# Patient Record
Sex: FEMALE | Race: WHITE | NOT HISPANIC OR LATINO | Employment: FULL TIME | URBAN - NONMETROPOLITAN AREA
[De-identification: names, ages, dates, MRNs, and addresses within clinical notes are randomized per-mention and may not be internally consistent; named-entity substitution may affect disease eponyms.]

---

## 2024-09-03 ENCOUNTER — APPOINTMENT (OUTPATIENT)
Dept: CARDIOLOGY | Facility: HOSPITAL | Age: 44
End: 2024-09-03
Payer: COMMERCIAL

## 2024-09-03 ENCOUNTER — APPOINTMENT (OUTPATIENT)
Dept: RADIOLOGY | Facility: HOSPITAL | Age: 44
End: 2024-09-03
Payer: COMMERCIAL

## 2024-09-03 ENCOUNTER — HOSPITAL ENCOUNTER (EMERGENCY)
Facility: HOSPITAL | Age: 44
Discharge: HOME | End: 2024-09-03
Attending: EMERGENCY MEDICINE
Payer: COMMERCIAL

## 2024-09-03 VITALS
OXYGEN SATURATION: 99 % | RESPIRATION RATE: 16 BRPM | TEMPERATURE: 99.1 F | HEIGHT: 61 IN | DIASTOLIC BLOOD PRESSURE: 106 MMHG | WEIGHT: 160 LBS | BODY MASS INDEX: 30.21 KG/M2 | HEART RATE: 87 BPM | SYSTOLIC BLOOD PRESSURE: 155 MMHG

## 2024-09-03 DIAGNOSIS — F10.10 ALCOHOL ABUSE: ICD-10-CM

## 2024-09-03 DIAGNOSIS — R07.9 ACUTE CHEST PAIN: Primary | ICD-10-CM

## 2024-09-03 LAB
ALBUMIN SERPL BCP-MCNC: 4.4 G/DL (ref 3.4–5)
ALP SERPL-CCNC: 62 U/L (ref 33–110)
ALT SERPL W P-5'-P-CCNC: 90 U/L (ref 7–45)
ANION GAP SERPL CALC-SCNC: 14 MMOL/L (ref 10–20)
AST SERPL W P-5'-P-CCNC: 72 U/L (ref 9–39)
ATRIAL RATE: 84 BPM
ATRIAL RATE: 99 BPM
B-HCG SERPL-ACNC: <2 MIU/ML
BASOPHILS # BLD AUTO: 0.04 X10*3/UL (ref 0–0.1)
BASOPHILS NFR BLD AUTO: 0.7 %
BILIRUB SERPL-MCNC: 0.4 MG/DL (ref 0–1.2)
BUN SERPL-MCNC: 8 MG/DL (ref 6–23)
CALCIUM SERPL-MCNC: 8.9 MG/DL (ref 8.6–10.3)
CARDIAC TROPONIN I PNL SERPL HS: 4 NG/L (ref 0–13)
CARDIAC TROPONIN I PNL SERPL HS: 5 NG/L (ref 0–13)
CHLORIDE SERPL-SCNC: 103 MMOL/L (ref 98–107)
CO2 SERPL-SCNC: 27 MMOL/L (ref 21–32)
CREAT SERPL-MCNC: 0.63 MG/DL (ref 0.5–1.05)
D DIMER PPP FEU-MCNC: 768 NG/ML FEU
EGFRCR SERPLBLD CKD-EPI 2021: >90 ML/MIN/1.73M*2
EOSINOPHIL # BLD AUTO: 0.08 X10*3/UL (ref 0–0.7)
EOSINOPHIL NFR BLD AUTO: 1.3 %
ERYTHROCYTE [DISTWIDTH] IN BLOOD BY AUTOMATED COUNT: 12.1 % (ref 11.5–14.5)
GLUCOSE SERPL-MCNC: 96 MG/DL (ref 74–99)
HCT VFR BLD AUTO: 41.7 % (ref 36–46)
HGB BLD-MCNC: 14.1 G/DL (ref 12–16)
IMM GRANULOCYTES # BLD AUTO: 0.03 X10*3/UL (ref 0–0.7)
IMM GRANULOCYTES NFR BLD AUTO: 0.5 % (ref 0–0.9)
LYMPHOCYTES # BLD AUTO: 1.33 X10*3/UL (ref 1.2–4.8)
LYMPHOCYTES NFR BLD AUTO: 22.2 %
MAGNESIUM SERPL-MCNC: 1.34 MG/DL (ref 1.6–2.4)
MCH RBC QN AUTO: 33.8 PG (ref 26–34)
MCHC RBC AUTO-ENTMCNC: 33.8 G/DL (ref 32–36)
MCV RBC AUTO: 100 FL (ref 80–100)
MONOCYTES # BLD AUTO: 0.4 X10*3/UL (ref 0.1–1)
MONOCYTES NFR BLD AUTO: 6.7 %
NEUTROPHILS # BLD AUTO: 4.12 X10*3/UL (ref 1.2–7.7)
NEUTROPHILS NFR BLD AUTO: 68.6 %
NRBC BLD-RTO: 0 /100 WBCS (ref 0–0)
P AXIS: 31 DEGREES
P OFFSET: 191 MS
P OFFSET: 192 MS
P ONSET: 134 MS
P ONSET: 135 MS
PLATELET # BLD AUTO: 200 X10*3/UL (ref 150–450)
POTASSIUM SERPL-SCNC: 3.2 MMOL/L (ref 3.5–5.3)
PR INTERVAL: 164 MS
PR INTERVAL: 164 MS
PROT SERPL-MCNC: 7.2 G/DL (ref 6.4–8.2)
Q ONSET: 216 MS
Q ONSET: 217 MS
QRS COUNT: 14 BEATS
QRS COUNT: 17 BEATS
QRS DURATION: 92 MS
QRS DURATION: 92 MS
QT INTERVAL: 348 MS
QT INTERVAL: 392 MS
QTC CALCULATION(BAZETT): 446 MS
QTC CALCULATION(BAZETT): 463 MS
QTC FREDERICIA: 411 MS
QTC FREDERICIA: 438 MS
R AXIS: 11 DEGREES
R AXIS: 176 DEGREES
RBC # BLD AUTO: 4.17 X10*6/UL (ref 4–5.2)
SODIUM SERPL-SCNC: 141 MMOL/L (ref 136–145)
T AXIS: 18 DEGREES
T AXIS: 183 DEGREES
T OFFSET: 390 MS
T OFFSET: 413 MS
TSH SERPL-ACNC: 1.37 MIU/L (ref 0.44–3.98)
VENTRICULAR RATE: 84 BPM
VENTRICULAR RATE: 99 BPM
WBC # BLD AUTO: 6 X10*3/UL (ref 4.4–11.3)

## 2024-09-03 PROCEDURE — 85379 FIBRIN DEGRADATION QUANT: CPT | Performed by: EMERGENCY MEDICINE

## 2024-09-03 PROCEDURE — 71045 X-RAY EXAM CHEST 1 VIEW: CPT | Performed by: RADIOLOGY

## 2024-09-03 PROCEDURE — 99285 EMERGENCY DEPT VISIT HI MDM: CPT

## 2024-09-03 PROCEDURE — 36415 COLL VENOUS BLD VENIPUNCTURE: CPT | Performed by: EMERGENCY MEDICINE

## 2024-09-03 PROCEDURE — 2550000001 HC RX 255 CONTRASTS: Performed by: EMERGENCY MEDICINE

## 2024-09-03 PROCEDURE — 93005 ELECTROCARDIOGRAM TRACING: CPT

## 2024-09-03 PROCEDURE — 85025 COMPLETE CBC W/AUTO DIFF WBC: CPT | Performed by: EMERGENCY MEDICINE

## 2024-09-03 PROCEDURE — 71275 CT ANGIOGRAPHY CHEST: CPT

## 2024-09-03 PROCEDURE — 71045 X-RAY EXAM CHEST 1 VIEW: CPT

## 2024-09-03 PROCEDURE — 83735 ASSAY OF MAGNESIUM: CPT | Performed by: EMERGENCY MEDICINE

## 2024-09-03 PROCEDURE — 84484 ASSAY OF TROPONIN QUANT: CPT | Performed by: EMERGENCY MEDICINE

## 2024-09-03 PROCEDURE — 96366 THER/PROPH/DIAG IV INF ADDON: CPT

## 2024-09-03 PROCEDURE — 2500000001 HC RX 250 WO HCPCS SELF ADMINISTERED DRUGS (ALT 637 FOR MEDICARE OP): Performed by: EMERGENCY MEDICINE

## 2024-09-03 PROCEDURE — 94762 N-INVAS EAR/PLS OXIMTRY CONT: CPT

## 2024-09-03 PROCEDURE — 84702 CHORIONIC GONADOTROPIN TEST: CPT | Performed by: EMERGENCY MEDICINE

## 2024-09-03 PROCEDURE — 96365 THER/PROPH/DIAG IV INF INIT: CPT | Mod: 59

## 2024-09-03 PROCEDURE — 80053 COMPREHEN METABOLIC PANEL: CPT | Performed by: EMERGENCY MEDICINE

## 2024-09-03 PROCEDURE — 84443 ASSAY THYROID STIM HORMONE: CPT | Performed by: EMERGENCY MEDICINE

## 2024-09-03 PROCEDURE — 2500000004 HC RX 250 GENERAL PHARMACY W/ HCPCS (ALT 636 FOR OP/ED): Performed by: EMERGENCY MEDICINE

## 2024-09-03 RX ORDER — BISMUTH SUBSALICYLATE 262 MG
1 TABLET,CHEWABLE ORAL DAILY
COMMUNITY

## 2024-09-03 RX ORDER — MAGNESIUM SULFATE HEPTAHYDRATE 40 MG/ML
2 INJECTION, SOLUTION INTRAVENOUS ONCE
Status: COMPLETED | OUTPATIENT
Start: 2024-09-03 | End: 2024-09-03

## 2024-09-03 RX ORDER — POTASSIUM CHLORIDE 1.5 G/1.58G
40 POWDER, FOR SOLUTION ORAL ONCE
Status: COMPLETED | OUTPATIENT
Start: 2024-09-03 | End: 2024-09-03

## 2024-09-03 RX ORDER — BUTYROSPERMUM PARKII(SHEA BUTTER), SIMMONDSIA CHINENSIS (JOJOBA) SEED OIL, ALOE BARBADENSIS LEAF EXTRACT .01; 1; 3.5 G/100G; G/100G; G/100G
250 LIQUID TOPICAL DAILY
COMMUNITY

## 2024-09-03 RX ORDER — MULTIVIT-MIN/IRON FUM/FOLIC AC 7.5 MG-4
1 TABLET ORAL DAILY
COMMUNITY

## 2024-09-03 ASSESSMENT — HEART SCORE
HEART SCORE: 0
TROPONIN: LESS THAN OR EQUAL TO NORMAL LIMIT
ECG: NORMAL
AGE: <45
HISTORY: SLIGHTLY SUSPICIOUS
RISK FACTORS: NO KNOWN RISK FACTORS

## 2024-09-03 ASSESSMENT — COLUMBIA-SUICIDE SEVERITY RATING SCALE - C-SSRS
6. HAVE YOU EVER DONE ANYTHING, STARTED TO DO ANYTHING, OR PREPARED TO DO ANYTHING TO END YOUR LIFE?: NO
1. IN THE PAST MONTH, HAVE YOU WISHED YOU WERE DEAD OR WISHED YOU COULD GO TO SLEEP AND NOT WAKE UP?: NO
2. HAVE YOU ACTUALLY HAD ANY THOUGHTS OF KILLING YOURSELF?: NO

## 2024-09-03 ASSESSMENT — PAIN SCALES - GENERAL
PAINLEVEL_OUTOF10: 0 - NO PAIN
PAINLEVEL_OUTOF10: 1
PAINLEVEL_OUTOF10: 0 - NO PAIN

## 2024-09-03 ASSESSMENT — PAIN - FUNCTIONAL ASSESSMENT
PAIN_FUNCTIONAL_ASSESSMENT: 0-10
PAIN_FUNCTIONAL_ASSESSMENT: 0-10

## 2024-09-03 ASSESSMENT — VISUAL ACUITY: OU: 1

## 2024-09-03 NOTE — ED PROVIDER NOTES
Tachycardia, chest pain.  This 43-year-old white female presents to the ED with complaints of increasing her heart rate she states that she noticed this in July 20 she was on the sun for approximate 4 hours and noted that her heart rate was racing.  She noticed this on her Apple Watch.  She states that 2 weeks ago she was walking in hot weather again and noted that her heart was racing much faster than it normally does.  She states that this morning she woke up with substernal chest tightness and tingling of her fingers she states that nothing seems to make her symptoms better or worse.  She denies previous history of having a heart catheterization or stress test.  She denies any family history of heart attacks that accommodates being less than the age of 50 she denies any use of cigarettes or other risk factors for coronary artery disease she does admit to daily use of alcohol.  Denies any illegal drug use.  She does admit to being somewhat anxious on arrival to the ED.      History provided by:  Patient   used: No         Physical Exam  Vitals and nursing note reviewed.   Constitutional:       General: She is awake.      Appearance: Normal appearance. She is normal weight.   HENT:      Head: Normocephalic and atraumatic.      Right Ear: Hearing and external ear normal.      Left Ear: Hearing and external ear normal.      Nose: Nose normal. No congestion or rhinorrhea.      Mouth/Throat:      Lips: Pink.      Mouth: Mucous membranes are moist.      Pharynx: Oropharynx is clear. Uvula midline. No oropharyngeal exudate or posterior oropharyngeal erythema.   Eyes:      General: Lids are normal. Vision grossly intact.         Right eye: No discharge.         Left eye: No discharge.      Extraocular Movements: Extraocular movements intact.      Conjunctiva/sclera: Conjunctivae normal.      Pupils: Pupils are equal, round, and reactive to light.   Cardiovascular:      Rate and Rhythm: Regular rhythm.  Tachycardia present.      Pulses: Normal pulses.      Heart sounds: Normal heart sounds. No murmur heard.     No friction rub. No gallop.   Pulmonary:      Effort: Pulmonary effort is normal. No respiratory distress.      Breath sounds: Normal breath sounds. No stridor. No wheezing, rhonchi or rales.   Chest:      Chest wall: No tenderness.   Abdominal:      General: Abdomen is flat. Bowel sounds are normal. There is no distension.      Palpations: Abdomen is soft. There is no mass.      Tenderness: There is no abdominal tenderness. There is no guarding or rebound.      Hernia: No hernia is present.      Comments: She has benign abdominal exam.   Musculoskeletal:         General: No swelling, tenderness, deformity or signs of injury. Normal range of motion.      Cervical back: Full passive range of motion without pain, normal range of motion and neck supple.      Right lower leg: Normal. No edema.      Left lower leg: Normal. No edema.   Skin:     General: Skin is warm and dry.      Capillary Refill: Capillary refill takes less than 2 seconds.      Coloration: Skin is not jaundiced or pale.      Findings: No bruising, erythema, lesion or rash.   Neurological:      General: No focal deficit present.      Mental Status: She is alert and oriented to person, place, and time.      GCS: GCS eye subscore is 4. GCS verbal subscore is 5. GCS motor subscore is 6.      Cranial Nerves: Cranial nerves 2-12 are intact. No cranial nerve deficit.      Sensory: Sensation is intact. No sensory deficit.      Motor: Motor function is intact. No weakness.      Coordination: Coordination is intact. Coordination normal.      Gait: Gait is intact.      Deep Tendon Reflexes: Reflexes normal.   Psychiatric:         Attention and Perception: Attention and perception normal.         Mood and Affect: Mood is anxious.         Speech: Speech normal.         Behavior: Behavior normal. Behavior is cooperative.         Thought Content: Thought  content normal.         Cognition and Memory: Cognition and memory normal.         Judgment: Judgment normal.          Labs Reviewed   COMPREHENSIVE METABOLIC PANEL - Abnormal       Result Value    Glucose 96      Sodium 141      Potassium 3.2 (*)     Chloride 103      Bicarbonate 27      Anion Gap 14      Urea Nitrogen 8      Creatinine 0.63      eGFR >90      Calcium 8.9      Albumin 4.4      Alkaline Phosphatase 62      Total Protein 7.2      AST 72 (*)     Bilirubin, Total 0.4      ALT 90 (*)    MAGNESIUM - Abnormal    Magnesium 1.34 (*)    D-DIMER, VTE EXCLUSION - Abnormal    D-Dimer, Quantitative VTE Exclusion 768 (*)     Narrative:     The VTE Exclusion D-Dimer assay is reported in ng/mL Fibrinogen Equivalent Units (FEU).    Per 's instructions for use, a value of less than 500 ng/mL (FEU) may help to exclude DVT or PE in outpatients when the assay is used with a clinical pretest probability assessment.([facility] must utilize and document eCalc 'Wells Score Deep Vein Thrombosis Risk' for DVT exclusion only. Emergency Department should utilize  Guidelines for Emergency Department Use of the VTE Exclusion D-Dimer and Clinical Pretest probability assessment model for DVT or PE exclusion.)   HUMAN CHORIONIC GONADOTROPIN, SERUM QUANTITATIVE - Normal    HCG, Beta-Quantitative <2      Narrative:      Total HCG measurement is performed using the [employer] Access   Immunoassay which detects intact HCG and free beta HCG subunit.    This test is not indicated for use as a tumor marker.   HCG testing is performed using a different test methodology at [facility] than other [facility]. Direct result comparison   should only be made within the same method.       TSH - Normal    Thyroid Stimulating Hormone 1.37      Narrative:     TSH testing is performed using different testing methodology at [facility] than at other [facility]. Direct result comparisons should only  be made within the same method.     SERIAL TROPONIN-INITIAL - Normal    Troponin I, High Sensitivity 4      Narrative:     Less than 99th percentile of normal range cutoff-  Female and children under 18 years old <14 ng/L; Male <21 ng/L: Negative  Repeat testing should be performed if clinically indicated.     Female and children under 18 years old 14-50 ng/L; Male 21-50 ng/L:  Consistent with possible cardiac damage and possible increased clinical   risk. Serial measurements may help to assess extent of myocardial damage.     >50 ng/L: Consistent with cardiac damage, increased clinical risk and  myocardial infarction. Serial measurements may help assess extent of   myocardial damage.      NOTE: Children less than 1 year old may have higher baseline troponin   levels and results should be interpreted in conjunction with the overall   clinical context.     NOTE: Troponin I testing is performed using a different   testing methodology at [facility] than at other   [facility]. Direct result comparisons should only   be made within the same method.   SERIAL TROPONIN, 1 HOUR - Normal    Troponin I, High Sensitivity 5      Narrative:     Less than 99th percentile of normal range cutoff-  Female and children under 18 years old <14 ng/L; Male <21 ng/L: Negative  Repeat testing should be performed if clinically indicated.     Female and children under 18 years old 14-50 ng/L; Male 21-50 ng/L:  Consistent with possible cardiac damage and possible increased clinical   risk. Serial measurements may help to assess extent of myocardial damage.     >50 ng/L: Consistent with cardiac damage, increased clinical risk and  myocardial infarction. Serial measurements may help assess extent of   myocardial damage.      NOTE: Children less than 1 year old may have higher baseline troponin   levels and results should be interpreted in conjunction with the overall   clinical context.     NOTE: Troponin I testing is  performed using a different   testing methodology at [facility] than at other   [facility]. Direct result comparisons should only   be made within the same method.   TROPONIN SERIES- (INITIAL, 1 HR)    Narrative:     The following orders were created for panel order Troponin I Series, High Sensitivity (0, 1 HR).  Procedure                               Abnormality         Status                     ---------                               -----------         ------                     Troponin I, High Sensiti...[[id number]]  Normal              Final result               Troponin, High Sensitivi...[[id number]]  Normal              Final result                 Please view results for these tests on the individual orders.   CBC WITH AUTO DIFFERENTIAL    WBC 6.0      nRBC 0.0      RBC 4.17      Hemoglobin 14.1      Hematocrit 41.7            MCH 33.8      MCHC 33.8      RDW 12.1      Platelets 200      Neutrophils % 68.6      Immature Granulocytes %, Automated 0.5      Lymphocytes % 22.2      Monocytes % 6.7      Eosinophils % 1.3      Basophils % 0.7      Neutrophils Absolute 4.12      Immature Granulocytes Absolute, Automated 0.03      Lymphocytes Absolute 1.33      Monocytes Absolute 0.40      Eosinophils Absolute 0.08      Basophils Absolute 0.04          CT angio chest for pulmonary embolism   Final Result   1.  No pulmonary embolism   2. Incidental fatty liver and hepatomegaly             MACRO:   None        Signed by: Pam Ochoa 9/3/2024 2:01 PM   Dictation workstation:   [id number]      XR chest 1 view   Final Result   No acute cardiopulmonary abnormality.        Signed by: Serg Arnold 9/3/2024 12:12 PM   Dictation workstation:   [id number]           Procedures     Medical Decision Making  Patient was seen and evaluated due to complaints of chest pain and increasing her heart rate specially associate with hot weather.  She states this chest pain symptoms started this  morning located in substernal area without radiation she was having some tingling of her hands.  Patient's initial EKG was normal.  Patient magnesium level was low and she was treated with IV magnesium sulfate.  Patient's potassium level was low and she was treated with oral potassium chloride.  The patient's D-dimer was elevated and subsequently a CTA of the chest was ordered to rule out the possibility of pulmonary embolism.  Repeat EKG was without ischemia.  After the CTA was read by the radiologist I did have a detailed discussion with the patient concerning her cardiac workup and the CTA results.  The patient CTA was negative for pulmonary embolism.  I did tell the patient that there is always a concern for cardiac arrhythmias due to alcohol abuse and that was why her magnesium and potassium levels were low.  She states that she plans on quitting alcohol.  Patient was treated with IV mag sulfate prior to be discharged home and she was provided the name of a primary care doctor that she could follow-up with as an outpatient.     Amount and/or Complexity of Data Reviewed  ECG/medicine tests: independent interpretation performed.     Details: EKG was interpreted by myself at 11:44 AM reveals normal sinus rhythm with right axis deviation and nonspecific ST-T wave changes in the inferior leads.  The heart rate is 99 bpm.  The NH interval is 164 ms.  The QRS duration 92 ms.  The QTc is 446 ms.  Axis is 176 degrees.  EKG was interpreted by myself at 12:49 PM reveals normal sinus rhythm with no acute ST or T wave changes.  The heart rate is 84 bpm NH interval is 164 ms.  The QRS duration is 92 ms.  The QTc is 463 ms.  Axis is 11 degrees.         Diagnoses as of 09/04/24 0719   Acute chest pain   Alcohol abuse                    Prosper Hooker,   09/04/24 0719

## 2024-09-20 ENCOUNTER — APPOINTMENT (OUTPATIENT)
Dept: PRIMARY CARE | Facility: CLINIC | Age: 44
End: 2024-09-20
Payer: COMMERCIAL

## 2024-09-20 VITALS
HEART RATE: 81 BPM | WEIGHT: 160.1 LBS | BODY MASS INDEX: 30.25 KG/M2 | DIASTOLIC BLOOD PRESSURE: 100 MMHG | OXYGEN SATURATION: 100 % | SYSTOLIC BLOOD PRESSURE: 160 MMHG

## 2024-09-20 DIAGNOSIS — K70.0 ALCOHOLIC FATTY LIVER: ICD-10-CM

## 2024-09-20 DIAGNOSIS — R74.8 ELEVATED LIVER ENZYMES: ICD-10-CM

## 2024-09-20 DIAGNOSIS — R00.0 TACHYCARDIA: Primary | ICD-10-CM

## 2024-09-20 PROCEDURE — 1036F TOBACCO NON-USER: CPT | Performed by: FAMILY MEDICINE

## 2024-09-20 PROCEDURE — 99214 OFFICE O/P EST MOD 30 MIN: CPT | Performed by: FAMILY MEDICINE

## 2024-09-20 RX ORDER — CLONIDINE HYDROCHLORIDE 0.1 MG/1
0.1 TABLET ORAL 2 TIMES DAILY
Qty: 60 TABLET | Refills: 0 | Status: SHIPPED | OUTPATIENT
Start: 2024-09-20 | End: 2025-03-19

## 2024-09-20 RX ORDER — BUPROPION HYDROCHLORIDE 150 MG/1
150 TABLET ORAL EVERY MORNING
Qty: 30 TABLET | Refills: 0 | Status: SHIPPED | OUTPATIENT
Start: 2024-09-20 | End: 2025-03-19

## 2024-09-20 RX ORDER — MAGNESIUM 250 MG
250 TABLET ORAL DAILY
Qty: 90 TABLET | Refills: 0 | Status: SHIPPED | OUTPATIENT
Start: 2024-09-20 | End: 2025-09-20

## 2024-09-20 RX ORDER — CHOLECALCIFEROL (VITAMIN D3) 125 MCG
1 CAPSULE ORAL AS NEEDED
COMMUNITY

## 2024-09-20 NOTE — PROGRESS NOTES
Subjective   Patient ID: Socorro Valderrama is a 43 y.o. female who presents for ER Follow-up (9/3 [facility]; acute chest pain, alcohol abuse; high blood pressure, stress due to mom being in the hospital).    ER Follow-up       ER follow-up from 9/3/2024 for racing heart rate which has been off and on since July 20 day of ED had substernal chest tightness and tingling of her fingers drinks alcohol daily no cigarettes  EKG normal sinus rhythm with a rate of 99 chest x-ray normal  Potassium 3.2 with elevated liver enzymes less than 3 times upper limit of normal  Elevated D-dimer at 768 with CT angio chest was negative for PE had fatty liver and large liver.    Tsh normal   Magnesium low     Takes a mvi   Alchohol daily wants to quit   Liquor 8 oz per day has escalated       4 years ago started   Spikes when hot in the heat   117 hr just standing 130 s with walking     Patient given 40 mill equivalents of potassium    Resting HR is normal 70   Anxiety with mothers sickness     Reason for alchohol   Withdrawal symptoms   Was able to quit when breakdown    Between drinks  930 by late afternoon elevated heartrate       Fhx strong for alcoholism with  MGM and mother     Holter done and no worrisome arrhthymias      Review of Systems    Objective   BP (!) 160/100   Pulse 81   Wt 72.6 kg (160 lb 1.6 oz)   SpO2 100%   BMI 30.25 kg/m²     Physical Exam  Vitals reviewed.   Constitutional:       Appearance: Normal appearance.   HENT:      Head: Normocephalic and atraumatic.   Eyes:      Conjunctiva/sclera: Conjunctivae normal.   Cardiovascular:      Rate and Rhythm: Normal rate and regular rhythm.   Pulmonary:      Effort: Pulmonary effort is normal.      Breath sounds: Normal breath sounds.   Musculoskeletal:      Cervical back: Neck supple.   Skin:     General: Skin is warm and dry.   Neurological:      General: No focal deficit present.      Mental Status: She is alert and oriented to person, place, and time.   Psychiatric:          Mood and Affect: Mood normal.         Behavior: Behavior normal.         Thought Content: Thought content normal.         Judgment: Judgment normal.         Assessment/Plan   Diagnoses and all orders for this visit:  Tachycardia  -     Holter or Event Cardiac Monitor; Future  -     Basic Metabolic Panel; Future  -     Magnesium; Future  -     cloNIDine (Catapres) 0.1 mg tablet; Take 1 tablet (0.1 mg) by mouth 2 times a day.  -     buPROPion XL (Wellbutrin XL) 150 mg 24 hr tablet; Take 1 tablet (150 mg) by mouth once daily in the morning. Do not crush, chew, or split.  -     magnesium 250 mg tablet; Take 1 tablet (250 mg) by mouth once daily.  Alcoholic fatty liver  Elevated liver enzymes    Will wean  off alcohol 1 drink per 5 days  Discussed   Librium but does not want to  start

## 2024-09-25 ENCOUNTER — HOSPITAL ENCOUNTER (OUTPATIENT)
Dept: CARDIOLOGY | Facility: HOSPITAL | Age: 44
Discharge: HOME | End: 2024-09-25
Payer: COMMERCIAL

## 2024-09-25 DIAGNOSIS — R00.0 TACHYCARDIA: ICD-10-CM

## 2024-09-25 PROCEDURE — 9420000001 HC RT PATIENT EDUCATION 5 MIN

## 2024-09-25 PROCEDURE — 93225 XTRNL ECG REC<48 HRS REC: CPT

## 2024-09-26 ENCOUNTER — APPOINTMENT (OUTPATIENT)
Dept: CARDIOLOGY | Facility: HOSPITAL | Age: 44
End: 2024-09-26
Payer: COMMERCIAL

## 2024-10-12 DIAGNOSIS — R00.0 TACHYCARDIA: ICD-10-CM

## 2024-10-14 RX ORDER — BUPROPION HYDROCHLORIDE 150 MG/1
150 TABLET ORAL EVERY MORNING
Qty: 90 TABLET | Refills: 1 | OUTPATIENT
Start: 2024-10-14 | End: 2025-04-12

## 2024-10-14 RX ORDER — CLONIDINE HYDROCHLORIDE 0.1 MG/1
0.1 TABLET ORAL 2 TIMES DAILY
Qty: 180 TABLET | Refills: 1 | OUTPATIENT
Start: 2024-10-14 | End: 2025-04-12

## 2024-10-18 ENCOUNTER — LAB (OUTPATIENT)
Dept: LAB | Facility: LAB | Age: 44
End: 2024-10-18
Payer: COMMERCIAL

## 2024-10-18 DIAGNOSIS — R00.0 TACHYCARDIA: ICD-10-CM

## 2024-10-18 LAB
ANION GAP SERPL CALC-SCNC: 14 MMOL/L (ref 10–20)
BUN SERPL-MCNC: 8 MG/DL (ref 6–23)
CALCIUM SERPL-MCNC: 9.2 MG/DL (ref 8.6–10.3)
CHLORIDE SERPL-SCNC: 102 MMOL/L (ref 98–107)
CO2 SERPL-SCNC: 27 MMOL/L (ref 21–32)
CREAT SERPL-MCNC: 0.62 MG/DL (ref 0.5–1.05)
EGFRCR SERPLBLD CKD-EPI 2021: >90 ML/MIN/1.73M*2
GLUCOSE SERPL-MCNC: 90 MG/DL (ref 74–99)
MAGNESIUM SERPL-MCNC: 1.43 MG/DL (ref 1.6–2.4)
POTASSIUM SERPL-SCNC: 4.3 MMOL/L (ref 3.5–5.3)
SODIUM SERPL-SCNC: 139 MMOL/L (ref 136–145)

## 2024-10-18 PROCEDURE — 83735 ASSAY OF MAGNESIUM: CPT

## 2024-10-18 PROCEDURE — 36415 COLL VENOUS BLD VENIPUNCTURE: CPT

## 2024-10-18 PROCEDURE — 80048 BASIC METABOLIC PNL TOTAL CA: CPT

## 2024-10-21 ENCOUNTER — APPOINTMENT (OUTPATIENT)
Dept: PRIMARY CARE | Facility: CLINIC | Age: 44
End: 2024-10-21
Payer: COMMERCIAL

## 2024-10-21 VITALS
OXYGEN SATURATION: 100 % | SYSTOLIC BLOOD PRESSURE: 150 MMHG | BODY MASS INDEX: 30.8 KG/M2 | DIASTOLIC BLOOD PRESSURE: 90 MMHG | WEIGHT: 163 LBS | HEART RATE: 99 BPM

## 2024-10-21 DIAGNOSIS — R00.0 TACHYCARDIA: ICD-10-CM

## 2024-10-21 PROCEDURE — 99213 OFFICE O/P EST LOW 20 MIN: CPT | Performed by: FAMILY MEDICINE

## 2024-10-21 RX ORDER — CLONIDINE HYDROCHLORIDE 0.1 MG/1
0.1 TABLET ORAL 2 TIMES DAILY
Qty: 60 TABLET | Refills: 2 | Status: SHIPPED | OUTPATIENT
Start: 2024-10-21 | End: 2025-04-19

## 2024-10-21 NOTE — PROGRESS NOTES
Subjective   Patient ID: Socorro Valderrama is a 43 y.o. female who presents for Follow-up (Tachycardia; 9/25/24 Holter was normal) and Alcohol Problem (Has weaned down to 5 drinks a day).    Alcohol Problem       Will wean  off alcohol 1 drink per 5 days  Discussed   Librium but does not want to  start      Increase  magnesium to 500 mg    Does not always drink 5 only   Weekends are harder and access is easy   Shakiness and gagging not nasuea       Alchohol daily wants to quit   Liquor 8 oz per day has escalated       4 years ago started   Spikes when hot in the heat   117 hr just standing 130 s with walking      Patient given 40 mill equivalents of potassium     Resting HR is normal 70   Anxiety with mothers sickness      Reason for alchohol   Withdrawal symptoms   Was able to quit when breakdown    Between drinks  930 by late afternoon elevated heartrate         Fhx strong for alcoholism with  MGM and mother      Holter done and no worrisome arrhythmias    Exercising and eating better   If misses meals does not feel as well     Socially not a problem with drinking    has quit drinking   Following people on social media   Counseling   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   Neurological:      Mental Status: She is alert.   Psychiatric:         Mood and Affect: Mood normal.         Behavior: Behavior normal.         Thought Content: Thought content normal.         Judgment: Judgment normal.         Assessment/Plan   Diagnoses and all orders for this visit:  Tachycardia  -     cloNIDine (Catapres) 0.1 mg tablet; Take 1 tablet (0.1 mg) by mouth 2 times a day.  -     Magnesium; Future    Discussed campral   Also discussed ways to monitor alcohol on the  weekends when it is harder to control.

## 2024-11-19 DIAGNOSIS — R00.0 TACHYCARDIA: ICD-10-CM

## 2024-11-20 RX ORDER — TALC
1 POWDER (GRAM) TOPICAL DAILY
Qty: 90 TABLET | OUTPATIENT
Start: 2024-11-20

## 2024-11-26 ENCOUNTER — APPOINTMENT (OUTPATIENT)
Dept: PRIMARY CARE | Facility: CLINIC | Age: 44
End: 2024-11-26
Payer: COMMERCIAL

## 2025-01-14 DIAGNOSIS — R00.0 TACHYCARDIA: ICD-10-CM

## 2025-01-14 RX ORDER — CLONIDINE HYDROCHLORIDE 0.1 MG/1
0.1 TABLET ORAL 2 TIMES DAILY
Qty: 60 TABLET | Refills: 2 | OUTPATIENT
Start: 2025-01-14

## 2025-01-17 DIAGNOSIS — R00.0 TACHYCARDIA: ICD-10-CM

## 2025-01-17 RX ORDER — CLONIDINE HYDROCHLORIDE 0.1 MG/1
0.1 TABLET ORAL 2 TIMES DAILY
Qty: 60 TABLET | Refills: 0 | Status: SHIPPED | OUTPATIENT
Start: 2025-01-17 | End: 2025-07-16

## 2025-01-17 NOTE — TELEPHONE ENCOUNTER
cloNIDine (Catapres) 0.1 mg tablet [[id number]]    Order Details  Dose: 0.1 mg Route: oral Frequency: 2 times daily   Dispense Quantity: 60 tablet Refills: 2          Sig: Take 1 tablet (0.1 mg) by mouth 2 times a day.   [facility]. SHE HAS A APPOINTMENT ON 1/22/25 AT 1:20 WITH ANDRE TO GET MORE REFILLS BUT WILL BE OUT BEFORE THAT/ THANK YOU. SHE WOULD LIKE A CALL BACK SO SHE KNOWS WHEN TO GET THEM.

## 2025-01-22 ENCOUNTER — APPOINTMENT (OUTPATIENT)
Dept: PRIMARY CARE | Facility: CLINIC | Age: 45
End: 2025-01-22
Payer: COMMERCIAL

## 2025-01-22 VITALS
SYSTOLIC BLOOD PRESSURE: 146 MMHG | OXYGEN SATURATION: 98 % | WEIGHT: 159 LBS | DIASTOLIC BLOOD PRESSURE: 104 MMHG | BODY MASS INDEX: 29.26 KG/M2 | HEART RATE: 94 BPM | HEIGHT: 62 IN

## 2025-01-22 DIAGNOSIS — I15.8 OTHER SECONDARY HYPERTENSION: Primary | ICD-10-CM

## 2025-01-22 DIAGNOSIS — R00.0 TACHYCARDIA: ICD-10-CM

## 2025-01-22 PROCEDURE — 1036F TOBACCO NON-USER: CPT | Performed by: NURSE PRACTITIONER

## 2025-01-22 PROCEDURE — 99214 OFFICE O/P EST MOD 30 MIN: CPT | Performed by: NURSE PRACTITIONER

## 2025-01-22 PROCEDURE — 3080F DIAST BP >= 90 MM HG: CPT | Performed by: NURSE PRACTITIONER

## 2025-01-22 PROCEDURE — 3077F SYST BP >= 140 MM HG: CPT | Performed by: NURSE PRACTITIONER

## 2025-01-22 PROCEDURE — 3008F BODY MASS INDEX DOCD: CPT | Performed by: NURSE PRACTITIONER

## 2025-01-22 RX ORDER — NEBULIZER AND COMPRESSOR
1 EACH MISCELLANEOUS 2 TIMES DAILY
Qty: 1 EACH | Refills: 0 | Status: SHIPPED | OUTPATIENT
Start: 2025-01-22

## 2025-01-22 RX ORDER — LOSARTAN POTASSIUM 25 MG/1
25 TABLET ORAL DAILY
Qty: 90 TABLET | Refills: 0 | Status: SHIPPED | OUTPATIENT
Start: 2025-01-22

## 2025-01-22 RX ORDER — NEBULIZER AND COMPRESSOR
1 EACH MISCELLANEOUS 2 TIMES DAILY
Qty: 1 EACH | Refills: 0 | Status: SHIPPED | OUTPATIENT
Start: 2025-01-22 | End: 2025-01-22

## 2025-01-22 RX ORDER — CLONIDINE HYDROCHLORIDE 0.1 MG/1
0.1 TABLET ORAL 2 TIMES DAILY
Qty: 180 TABLET | Refills: 1 | Status: SHIPPED | OUTPATIENT
Start: 2025-01-22

## 2025-01-22 ASSESSMENT — ENCOUNTER SYMPTOMS
CHILLS: 0
FATIGUE: 0
ABDOMINAL DISTENTION: 0
NUMBNESS: 0
NAUSEA: 0
HEADACHES: 0
WHEEZING: 0
WEAKNESS: 0
FEVER: 0
CONSTIPATION: 0
DIARRHEA: 0
ABDOMINAL PAIN: 0
VOMITING: 0
COUGH: 0
SPEECH DIFFICULTY: 0
CARDIOVASCULAR NEGATIVE: 1
DIZZINESS: 0
SHORTNESS OF BREATH: 0
PALPITATIONS: 0

## 2025-01-22 ASSESSMENT — PATIENT HEALTH QUESTIONNAIRE - PHQ9
2. FEELING DOWN, DEPRESSED OR HOPELESS: NOT AT ALL
SUM OF ALL RESPONSES TO PHQ9 QUESTIONS 1 AND 2: 0
1. LITTLE INTEREST OR PLEASURE IN DOING THINGS: NOT AT ALL

## 2025-01-22 NOTE — PROGRESS NOTES
"Subjective   Patient ID: Socorro Valderrama is a 44 y.o. female who presents for Med Management.  45 yo female presents for medication refills on her Clonidine.  Noted her elevated BP.  Denies any symptoms of ha, dizziness, near syncope, cp or sob.  States she continues to drink 8 oz of alcohol daily and is considering treatment in [place].  Discussed ways she could begin to decrease her intake safely prior to appt in [place].    She believes her mother and possibly her father had HTN.          Review of Systems   Constitutional:  Negative for chills, fatigue and fever.   HENT: Negative.     Respiratory:  Negative for cough, shortness of breath and wheezing.    Cardiovascular: Negative.  Negative for chest pain and palpitations.   Gastrointestinal:  Negative for abdominal distention, abdominal pain, constipation, diarrhea, nausea and vomiting.   Genitourinary: Negative.    Neurological:  Negative for dizziness, syncope, speech difficulty, weakness, numbness and headaches.       Objective   Physical Exam  Constitutional:       General: She is not in acute distress.  Cardiovascular:      Rate and Rhythm: Normal rate and regular rhythm.   Pulmonary:      Breath sounds: No wheezing, rhonchi or rales.   Skin:     General: Skin is warm and dry.   Neurological:      Mental Status: She is alert.       BP (!) 146/104 (BP Location: Left arm, Patient Position: Sitting)   Pulse 94   Ht 1.562 m (5' 1.5\")   Wt 72.1 kg (159 lb)   SpO2 98%   BMI 29.56 kg/m²       Current Outpatient Medications:     magnesium 250 mg tablet, Take 1 tablet (250 mg) by mouth once daily., Disp: 90 tablet, Rfl: 0    multivitamin tablet, Take 1 tablet by mouth once daily., Disp: , Rfl:     naproxen sodium (Aleve) 220 mg capsule, Take 1 tablet by mouth if needed. OTC, Disp: , Rfl:     cloNIDine (Catapres) 0.1 mg tablet, Take 1 tablet (0.1 mg) by mouth 2 times a day., Disp: 180 tablet, Rfl: 1    losartan (Cozaar) 25 mg tablet, Take 1 tablet (25 mg) by " mouth once daily., Disp: 90 tablet, Rfl: 0    miscellaneous medical supply (Blood Pressure Cuff) misc, 1 each 2 times a day. Antecubital cuff, Disp: 1 each, Rfl: 0   Past Medical History:   Diagnosis Date    Encounter for full-term uncomplicated delivery     Normal vaginal delivery    Encounter for gynecological examination (general) (routine) without abnormal findings     Pap test, as part of routine gynecological examination    Other conditions influencing health status     Menstruation    Personal history of other complications of pregnancy, childbirth and the puerperium     H/O miscarriage, not currently pregnant      Past Surgical History:   Procedure Laterality Date    OTHER SURGICAL HISTORY  03/27/2020    Surgery    OTHER SURGICAL HISTORY  03/27/2020    Lumpectomy    OTHER SURGICAL HISTORY Left     knee, 2 years ago        Family History   Problem Relation Name Age of Onset    Hepatitis Father      Diabetes Maternal Grandfather      Breast cancer Paternal Grandmother      Cancer Other      Diabetes Other          Assessment/Plan   Problem List Items Addressed This Visit    None  Visit Diagnoses       Other secondary hypertension    -  Primary    Relevant Medications    losartan (Cozaar) 25 mg tablet    miscellaneous medical supply (Blood Pressure Cuff) misc    Tachycardia        Relevant Medications    cloNIDine (Catapres) 0.1 mg tablet    losartan (Cozaar) 25 mg tablet    miscellaneous medical supply (Blood Pressure Cuff) misc        She will start checking her BP at home am and pm.  RX for cuff given.  Start on Losartan 25mg daily and continue on the Clonidine BID.  Discussed risks of CVA and MI and educated on symptoms.  She will go to ER if develops any symptoms.  Discussed safe way to decrease her alcohol intake and she will discuss referral to treatment center IP in [place] on her next visit.    Pt is going directly home to take an additional clonidine now and again at hs.  Follow up in 1 week.

## 2025-02-05 ENCOUNTER — APPOINTMENT (OUTPATIENT)
Dept: PRIMARY CARE | Facility: CLINIC | Age: 45
End: 2025-02-05
Payer: COMMERCIAL

## 2025-02-12 ENCOUNTER — APPOINTMENT (OUTPATIENT)
Dept: PRIMARY CARE | Facility: CLINIC | Age: 45
End: 2025-02-12
Payer: COMMERCIAL

## 2025-02-12 ENCOUNTER — TELEPHONE (OUTPATIENT)
Dept: PRIMARY CARE | Facility: CLINIC | Age: 45
End: 2025-02-12

## 2025-02-12 VITALS
SYSTOLIC BLOOD PRESSURE: 140 MMHG | OXYGEN SATURATION: 98 % | HEART RATE: 70 BPM | BODY MASS INDEX: 29.37 KG/M2 | DIASTOLIC BLOOD PRESSURE: 90 MMHG | WEIGHT: 158 LBS

## 2025-02-12 DIAGNOSIS — F41.9 ANXIETY: ICD-10-CM

## 2025-02-12 DIAGNOSIS — R00.0 TACHYCARDIA: ICD-10-CM

## 2025-02-12 DIAGNOSIS — F10.280 ALCOHOL DEPENDENCE WITH ALCOHOL-INDUCED ANXIETY DISORDER (MULTI): ICD-10-CM

## 2025-02-12 DIAGNOSIS — I10 PRIMARY HYPERTENSION: Primary | ICD-10-CM

## 2025-02-12 PROCEDURE — 99213 OFFICE O/P EST LOW 20 MIN: CPT | Performed by: NURSE PRACTITIONER

## 2025-02-12 PROCEDURE — 1036F TOBACCO NON-USER: CPT | Performed by: NURSE PRACTITIONER

## 2025-02-12 PROCEDURE — 3080F DIAST BP >= 90 MM HG: CPT | Performed by: NURSE PRACTITIONER

## 2025-02-12 PROCEDURE — 3077F SYST BP >= 140 MM HG: CPT | Performed by: NURSE PRACTITIONER

## 2025-02-12 RX ORDER — CLONIDINE HYDROCHLORIDE 0.1 MG/1
0.1 TABLET ORAL 3 TIMES DAILY
Qty: 180 TABLET | Refills: 1 | Status: SHIPPED | OUTPATIENT
Start: 2025-02-12

## 2025-02-12 ASSESSMENT — ENCOUNTER SYMPTOMS
DIARRHEA: 0
VOMITING: 0
PALPITATIONS: 0
SHORTNESS OF BREATH: 0
FEVER: 0
COUGH: 0
NAUSEA: 0
WHEEZING: 0
NERVOUS/ANXIOUS: 1

## 2025-02-12 ASSESSMENT — PATIENT HEALTH QUESTIONNAIRE - PHQ9
1. LITTLE INTEREST OR PLEASURE IN DOING THINGS: NOT AT ALL
SUM OF ALL RESPONSES TO PHQ9 QUESTIONS 1 AND 2: 0
2. FEELING DOWN, DEPRESSED OR HOPELESS: NOT AT ALL

## 2025-02-12 NOTE — PROGRESS NOTES
Subjective   Patient ID: Socorro Valderrama is a 44 y.o. female who presents for Follow-up.  Patient presents today for follow up of HTN.  Noted she was in ER on 2-9 for anxiety.  States she continues to monitor her watch for her HR and believes that is making her anxious.  She would like a referral for alcohol addition in [place].          Review of Systems   Constitutional:  Negative for fever.   Respiratory:  Negative for cough, shortness of breath and wheezing.    Cardiovascular:  Negative for chest pain and palpitations.   Gastrointestinal:  Negative for diarrhea, nausea and vomiting.   Psychiatric/Behavioral:  The patient is nervous/anxious.        Objective   Physical Exam  Vitals and nursing note reviewed.   Constitutional:       General: She is not in acute distress.     Appearance: She is not ill-appearing.   Cardiovascular:      Rate and Rhythm: Normal rate and regular rhythm.      Pulses: Normal pulses.      Heart sounds: Normal heart sounds. No murmur heard.     No friction rub. No gallop.   Pulmonary:      Effort: Pulmonary effort is normal.      Breath sounds: Normal breath sounds. No wheezing, rhonchi or rales.   Musculoskeletal:      Right lower leg: No edema.      Left lower leg: No edema.   Skin:     General: Skin is warm and dry.   Neurological:      Mental Status: She is alert.   Psychiatric:         Mood and Affect: Mood is anxious.       /90 (BP Location: Left arm, Patient Position: Sitting)   Pulse 70   Wt 71.7 kg (158 lb)   SpO2 98%   BMI 29.37 kg/m²       Current Outpatient Medications:     losartan (Cozaar) 25 mg tablet, Take 1 tablet (25 mg) by mouth once daily., Disp: 90 tablet, Rfl: 0    magnesium 250 mg tablet, Take 1 tablet (250 mg) by mouth once daily., Disp: 90 tablet, Rfl: 0    miscellaneous medical supply (Blood Pressure Cuff) misc, 1 each 2 times a day. Antecubital cuff, Disp: 1 each, Rfl: 0    multivitamin tablet, Take 1 tablet by mouth once daily., Disp: , Rfl:      naproxen sodium (Aleve) 220 mg capsule, Take 1 tablet by mouth if needed. OTC, Disp: , Rfl:     cloNIDine (Catapres) 0.1 mg tablet, Take 1 tablet (0.1 mg) by mouth 3 times a day., Disp: 180 tablet, Rfl: 1   Past Medical History:   Diagnosis Date    Encounter for full-term uncomplicated delivery     Normal vaginal delivery    Encounter for gynecological examination (general) (routine) without abnormal findings     Pap test, as part of routine gynecological examination    Other conditions influencing health status     Menstruation    Personal history of other complications of pregnancy, childbirth and the puerperium     H/O miscarriage, not currently pregnant      Past Surgical History:   Procedure Laterality Date    OTHER SURGICAL HISTORY  03/27/2020    Surgery    OTHER SURGICAL HISTORY  03/27/2020    Lumpectomy    OTHER SURGICAL HISTORY Left     knee, 2 years ago        Family History   Problem Relation Name Age of Onset    Hepatitis Father      Diabetes Maternal Grandfather      Breast cancer Paternal Grandmother      Cancer Other      Diabetes Other          Assessment/Plan   Problem List Items Addressed This Visit    None  Visit Diagnoses       Primary hypertension    -  Primary    Tachycardia        Relevant Medications    cloNIDine (Catapres) 0.1 mg tablet    Anxiety        Relevant Orders    TSH    Alcohol dependence with alcohol-induced anxiety disorder (Multi)        Relevant Orders    Referral to [facility]        Obtain TSH.  Increase her clonidine to 0.1mg tid and refer to addition medicine; pt requests [facility].

## 2025-02-12 NOTE — TELEPHONE ENCOUNTER
[facility] for patient that Bruna Pearl asked me to call her with information for [facility] that she can call to request and assessment for OP treatment.  Their number is [phone] and they are located at [address] [place] [address] in [facility].  She can call if any questions

## 2025-02-13 LAB — TSH SERPL-ACNC: 1.56 MIU/L

## 2025-03-12 ENCOUNTER — APPOINTMENT (OUTPATIENT)
Dept: PRIMARY CARE | Facility: CLINIC | Age: 45
End: 2025-03-12
Payer: COMMERCIAL

## 2025-03-21 ENCOUNTER — APPOINTMENT (OUTPATIENT)
Dept: PRIMARY CARE | Facility: CLINIC | Age: 45
End: 2025-03-21
Payer: COMMERCIAL

## 2025-04-22 ENCOUNTER — HOSPITAL ENCOUNTER (INPATIENT)
Dept: HOSPITAL 100 - ED | Age: 45
LOS: 4 days | Discharge: HOME | DRG: 897 | End: 2025-04-26
Payer: COMMERCIAL

## 2025-04-22 VITALS
DIASTOLIC BLOOD PRESSURE: 109 MMHG | SYSTOLIC BLOOD PRESSURE: 176 MMHG | TEMPERATURE: 97.88 F | HEART RATE: 69 BPM | RESPIRATION RATE: 18 BRPM | OXYGEN SATURATION: 100 %

## 2025-04-22 VITALS
HEART RATE: 85 BPM | SYSTOLIC BLOOD PRESSURE: 169 MMHG | RESPIRATION RATE: 17 BRPM | DIASTOLIC BLOOD PRESSURE: 111 MMHG | TEMPERATURE: 98.96 F | OXYGEN SATURATION: 98 %

## 2025-04-22 VITALS
DIASTOLIC BLOOD PRESSURE: 115 MMHG | TEMPERATURE: 98.3 F | RESPIRATION RATE: 16 BRPM | SYSTOLIC BLOOD PRESSURE: 175 MMHG | HEART RATE: 73 BPM | OXYGEN SATURATION: 100 %

## 2025-04-22 VITALS — BODY MASS INDEX: 30.1 KG/M2 | BODY MASS INDEX: 30 KG/M2 | BODY MASS INDEX: 31.4 KG/M2

## 2025-04-22 VITALS
DIASTOLIC BLOOD PRESSURE: 137 MMHG | SYSTOLIC BLOOD PRESSURE: 205 MMHG | HEART RATE: 86 BPM | OXYGEN SATURATION: 95 % | RESPIRATION RATE: 20 BRPM | TEMPERATURE: 98.06 F

## 2025-04-22 VITALS — OXYGEN SATURATION: 98 % | RESPIRATION RATE: 18 BRPM | HEART RATE: 90 BPM

## 2025-04-22 VITALS — RESPIRATION RATE: 18 BRPM
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
## 2025-04-22 DIAGNOSIS — F10.239: Primary | ICD-10-CM

## 2025-04-22 DIAGNOSIS — I10: ICD-10-CM

## 2025-04-22 DIAGNOSIS — Z79.899: ICD-10-CM

## 2025-04-22 DIAGNOSIS — E66.811: ICD-10-CM

## 2025-04-22 DIAGNOSIS — K70.10: ICD-10-CM

## 2025-04-22 DIAGNOSIS — E87.6: ICD-10-CM

## 2025-04-22 LAB
ALBUMIN SERPL-MCNC: 4.5 G/DL (ref 3.5–5)
ALCOHOL, BLOOD (MEDICAL)-SERUM: 67.9 MG/DL (ref ?–10)
ALP SERPL-CCNC: 59 U/L (ref 35–104)
ALT SERPL-CCNC: 78 U/L (ref ?–34)
AMPHETAMINES UR QL SCN>1000 NG/ML: NEGATIVE
ANION GAP SERPL CALC-SCNC: 19 MMOL/L (ref 5–15)
AST(SGOT): 147 U/L (ref ?–31)
BENZODIAZEPINE URINE: NEGATIVE
BLOOD UR QL: (no result) /HPF (ref 0–5)
BLOOD UR QL: 10 /UL
BUN SERPL-MCNC: 6 MG/DL (ref 4–19)
BUN/CREAT SERPL: 10.4 RATIO (ref 10–20)
CALCIUM SERPL-MCNC: 9.1 MG/DL (ref 7.6–11)
CHLORIDE: 98 MMOL/L (ref 98–108)
CO2 BLDCV-SCNC: 21.4 MMOL/L (ref 21–32)
DEPRECATED RDW RBC: 47.8 FL (ref 35.1–43.9)
ERYTHROCYTE [DISTWIDTH] IN BLOOD: 13.2 % (ref 11.6–14.6)
ESTIMATED CREATININE CLEARANCE: 107.42 ML/MIN (ref 50–250)
GLOBULIN: 2.6 G/DL (ref 2.2–4.2)
GLUCOSE SERPL-MCNC: 75 MG/DL (ref 70–99)
HCG SERPL QL: NEGATIVE NEGATIVE
HCT VFR BLD AUTO: 37.4 % (ref 37–47)
HGB BLD-MCNC: 13.4 G/DL (ref 12–15)
INTERNAL QC VALIDATED?: (no result)
KETONE-DIPSTICK: 150 MG/DL
LIPASE: 75 U/L (ref 13–75)
MCV RBC: 99.2 FL (ref 81–99)
MEAN CORP HGB CONC: 35.8 G/DL (ref 32–36)
MEAN PLATELET VOL.: 10.6 FL (ref 6.2–12)
MUCOUS THREADS URNS QL MICRO: (no result) /HPF
NRBC FLAGGED BY ANALYZER: 0 % (ref 0–5)
PCP UR QL: NEGATIVE
PLATELET # BLD: 156 K/MM3 (ref 150–450)
POTASSIUM SPEC-MCNC: 3.4 MMOL/L (ref 3.3–5.1)
PROT UR QL STRIP.AUTO: 100 MG/DL
RBC # BLD AUTO: 3.77 M/MM3 (ref 4.2–5.4)
SP GR UR: 1.01 (ref 1–1.03)
SQUAMOUS URNS QL MICRO: (no result) /HPF (ref 5–10)
THC URINE: NEGATIVE
URINE PRESERVATIVE: (no result)
WBC # BLD AUTO: 5.7 K/MM3 (ref 4.4–11)

## 2025-04-22 PROCEDURE — 83690 ASSAY OF LIPASE: CPT

## 2025-04-22 PROCEDURE — 99284 EMERGENCY DEPT VISIT MOD MDM: CPT

## 2025-04-22 PROCEDURE — 84703 CHORIONIC GONADOTROPIN ASSAY: CPT

## 2025-04-22 PROCEDURE — 84100 ASSAY OF PHOSPHORUS: CPT

## 2025-04-22 PROCEDURE — 36415 COLL VENOUS BLD VENIPUNCTURE: CPT

## 2025-04-22 PROCEDURE — 80053 COMPREHEN METABOLIC PANEL: CPT

## 2025-04-22 PROCEDURE — A4216 STERILE WATER/SALINE, 10 ML: HCPCS

## 2025-04-22 PROCEDURE — 80307 DRUG TEST PRSMV CHEM ANLYZR: CPT

## 2025-04-22 PROCEDURE — 80048 BASIC METABOLIC PNL TOTAL CA: CPT

## 2025-04-22 PROCEDURE — 83735 ASSAY OF MAGNESIUM: CPT

## 2025-04-22 PROCEDURE — 82077 ASSAY SPEC XCP UR&BREATH IA: CPT

## 2025-04-22 PROCEDURE — 80076 HEPATIC FUNCTION PANEL: CPT

## 2025-04-22 PROCEDURE — 85025 COMPLETE CBC W/AUTO DIFF WBC: CPT

## 2025-04-22 PROCEDURE — 81001 URINALYSIS AUTO W/SCOPE: CPT

## 2025-04-22 RX ADMIN — HYDROXYZINE PAMOATE 50 MG: 25 CAPSULE ORAL at 20:16

## 2025-04-23 VITALS
SYSTOLIC BLOOD PRESSURE: 145 MMHG | TEMPERATURE: 97.6 F | HEART RATE: 81 BPM | OXYGEN SATURATION: 98 % | DIASTOLIC BLOOD PRESSURE: 105 MMHG | RESPIRATION RATE: 15 BRPM

## 2025-04-23 VITALS
OXYGEN SATURATION: 99 % | RESPIRATION RATE: 17 BRPM | TEMPERATURE: 97.88 F | HEART RATE: 83 BPM | SYSTOLIC BLOOD PRESSURE: 143 MMHG | DIASTOLIC BLOOD PRESSURE: 107 MMHG

## 2025-04-23 VITALS
RESPIRATION RATE: 16 BRPM | SYSTOLIC BLOOD PRESSURE: 143 MMHG | HEART RATE: 70 BPM | DIASTOLIC BLOOD PRESSURE: 107 MMHG | OXYGEN SATURATION: 99 % | TEMPERATURE: 97.9 F

## 2025-04-23 VITALS
SYSTOLIC BLOOD PRESSURE: 156 MMHG | DIASTOLIC BLOOD PRESSURE: 108 MMHG | RESPIRATION RATE: 18 BRPM | HEART RATE: 107 BPM | TEMPERATURE: 97.88 F | OXYGEN SATURATION: 99 %

## 2025-04-23 VITALS
HEART RATE: 78 BPM | RESPIRATION RATE: 14 BRPM | TEMPERATURE: 98.1 F | OXYGEN SATURATION: 99 % | SYSTOLIC BLOOD PRESSURE: 135 MMHG | DIASTOLIC BLOOD PRESSURE: 98 MMHG

## 2025-04-23 LAB
ALBUMIN SERPL-MCNC: 3.7 G/DL (ref 3.5–5)
ALP SERPL-CCNC: 47 U/L (ref 35–104)
ALT SERPL-CCNC: 50 U/L (ref ?–34)
ANION GAP SERPL CALC-SCNC: 14 MMOL/L (ref 5–15)
AST(SGOT): 68 U/L (ref ?–31)
BUN SERPL-MCNC: 9 MG/DL (ref 4–19)
BUN/CREAT SERPL: 12.5 RATIO (ref 10–20)
CALCIUM SERPL-MCNC: 8.8 MG/DL (ref 7.6–11)
CHLORIDE: 97 MMOL/L (ref 98–108)
CO2 BLDCV-SCNC: 23.5 MMOL/L (ref 21–32)
GLOBULIN: 2.2 G/DL (ref 2.2–4.2)
GLUCOSE SERPL-MCNC: 86 MG/DL (ref 70–99)
POTASSIUM SPEC-MCNC: 3.6 MMOL/L (ref 3.3–5.1)

## 2025-04-23 RX ADMIN — SODIUM CHLORIDE, PRESERVATIVE FREE 0 ML: 5 INJECTION INTRAVENOUS at 20:37

## 2025-04-23 RX ADMIN — HYDROXYZINE PAMOATE 50 MG: 25 CAPSULE ORAL at 14:53

## 2025-04-23 RX ADMIN — HYDROXYZINE PAMOATE 50 MG: 25 CAPSULE ORAL at 00:16

## 2025-04-23 RX ADMIN — Medication 100 MG: at 08:09

## 2025-04-23 RX ADMIN — HYDROXYZINE PAMOATE 50 MG: 25 CAPSULE ORAL at 04:34

## 2025-04-23 RX ADMIN — SODIUM CHLORIDE, PRESERVATIVE FREE 0 ML: 5 INJECTION INTRAVENOUS at 08:21

## 2025-04-23 RX ADMIN — HYDROXYZINE PAMOATE 50 MG: 25 CAPSULE ORAL at 08:15

## 2025-04-24 VITALS
OXYGEN SATURATION: 98 % | SYSTOLIC BLOOD PRESSURE: 151 MMHG | DIASTOLIC BLOOD PRESSURE: 108 MMHG | TEMPERATURE: 98.42 F | RESPIRATION RATE: 18 BRPM | HEART RATE: 73 BPM

## 2025-04-24 VITALS
DIASTOLIC BLOOD PRESSURE: 101 MMHG | HEART RATE: 71 BPM | TEMPERATURE: 98.1 F | SYSTOLIC BLOOD PRESSURE: 149 MMHG | RESPIRATION RATE: 16 BRPM | OXYGEN SATURATION: 97 %

## 2025-04-24 VITALS
TEMPERATURE: 98.1 F | SYSTOLIC BLOOD PRESSURE: 178 MMHG | RESPIRATION RATE: 18 BRPM | DIASTOLIC BLOOD PRESSURE: 116 MMHG | OXYGEN SATURATION: 99 % | HEART RATE: 72 BPM

## 2025-04-24 VITALS
TEMPERATURE: 97.9 F | SYSTOLIC BLOOD PRESSURE: 160 MMHG | RESPIRATION RATE: 17 BRPM | HEART RATE: 69 BPM | DIASTOLIC BLOOD PRESSURE: 107 MMHG | OXYGEN SATURATION: 98 %

## 2025-04-24 VITALS
HEART RATE: 78 BPM | TEMPERATURE: 98.7 F | SYSTOLIC BLOOD PRESSURE: 116 MMHG | DIASTOLIC BLOOD PRESSURE: 82 MMHG | RESPIRATION RATE: 18 BRPM | OXYGEN SATURATION: 98 %

## 2025-04-24 VITALS
TEMPERATURE: 98.06 F | SYSTOLIC BLOOD PRESSURE: 179 MMHG | RESPIRATION RATE: 16 BRPM | OXYGEN SATURATION: 98 % | HEART RATE: 69 BPM | DIASTOLIC BLOOD PRESSURE: 117 MMHG

## 2025-04-24 VITALS
DIASTOLIC BLOOD PRESSURE: 82 MMHG | OXYGEN SATURATION: 99 % | RESPIRATION RATE: 18 BRPM | TEMPERATURE: 98.7 F | SYSTOLIC BLOOD PRESSURE: 116 MMHG | HEART RATE: 72 BPM

## 2025-04-24 VITALS
OXYGEN SATURATION: 94 % | HEART RATE: 76 BPM | SYSTOLIC BLOOD PRESSURE: 183 MMHG | RESPIRATION RATE: 22 BRPM | DIASTOLIC BLOOD PRESSURE: 117 MMHG

## 2025-04-24 VITALS — HEART RATE: 155 BPM

## 2025-04-24 RX ADMIN — HYDROXYZINE PAMOATE 50 MG: 25 CAPSULE ORAL at 15:11

## 2025-04-24 RX ADMIN — SODIUM CHLORIDE, PRESERVATIVE FREE 0 ML: 5 INJECTION INTRAVENOUS at 23:41

## 2025-04-24 RX ADMIN — LORAZEPAM 2 MG: 2 INJECTION INTRAMUSCULAR; INTRAVENOUS at 23:41

## 2025-04-24 RX ADMIN — DEXMEDETOMIDINE 9 MCG: 100 INJECTION, SOLUTION INTRAVENOUS at 22:50

## 2025-04-24 RX ADMIN — SODIUM CHLORIDE, PRESERVATIVE FREE 0 ML: 5 INJECTION INTRAVENOUS at 21:27

## 2025-04-24 RX ADMIN — Medication 100 MG: at 09:28

## 2025-04-24 RX ADMIN — LORAZEPAM 2 MG: 2 INJECTION INTRAMUSCULAR; INTRAVENOUS at 21:27

## 2025-04-24 RX ADMIN — HYDROXYZINE PAMOATE 50 MG: 25 CAPSULE ORAL at 20:09

## 2025-04-24 RX ADMIN — HYDROXYZINE PAMOATE 50 MG: 25 CAPSULE ORAL at 01:13

## 2025-04-25 VITALS
DIASTOLIC BLOOD PRESSURE: 95 MMHG | SYSTOLIC BLOOD PRESSURE: 129 MMHG | HEART RATE: 80 BPM | OXYGEN SATURATION: 98 % | RESPIRATION RATE: 18 BRPM

## 2025-04-25 VITALS
RESPIRATION RATE: 16 BRPM | HEART RATE: 59 BPM | OXYGEN SATURATION: 94 % | SYSTOLIC BLOOD PRESSURE: 146 MMHG | TEMPERATURE: 97.7 F | DIASTOLIC BLOOD PRESSURE: 95 MMHG

## 2025-04-25 VITALS
OXYGEN SATURATION: 98 % | DIASTOLIC BLOOD PRESSURE: 104 MMHG | SYSTOLIC BLOOD PRESSURE: 122 MMHG | HEART RATE: 86 BPM | TEMPERATURE: 98.42 F | RESPIRATION RATE: 20 BRPM

## 2025-04-25 VITALS
RESPIRATION RATE: 16 BRPM | SYSTOLIC BLOOD PRESSURE: 111 MMHG | HEART RATE: 68 BPM | OXYGEN SATURATION: 96 % | DIASTOLIC BLOOD PRESSURE: 83 MMHG

## 2025-04-25 VITALS — OXYGEN SATURATION: 99 %

## 2025-04-25 VITALS
DIASTOLIC BLOOD PRESSURE: 112 MMHG | HEART RATE: 64 BPM | RESPIRATION RATE: 16 BRPM | SYSTOLIC BLOOD PRESSURE: 158 MMHG | OXYGEN SATURATION: 95 %

## 2025-04-25 VITALS
HEART RATE: 63 BPM | RESPIRATION RATE: 15 BRPM | OXYGEN SATURATION: 99 % | SYSTOLIC BLOOD PRESSURE: 124 MMHG | DIASTOLIC BLOOD PRESSURE: 87 MMHG

## 2025-04-25 VITALS
HEART RATE: 74 BPM | SYSTOLIC BLOOD PRESSURE: 151 MMHG | DIASTOLIC BLOOD PRESSURE: 94 MMHG | OXYGEN SATURATION: 99 % | RESPIRATION RATE: 20 BRPM | TEMPERATURE: 97.88 F

## 2025-04-25 VITALS
HEART RATE: 72 BPM | SYSTOLIC BLOOD PRESSURE: 164 MMHG | OXYGEN SATURATION: 99 % | RESPIRATION RATE: 18 BRPM | DIASTOLIC BLOOD PRESSURE: 74 MMHG

## 2025-04-25 VITALS
TEMPERATURE: 100.04 F | DIASTOLIC BLOOD PRESSURE: 102 MMHG | HEART RATE: 99 BPM | OXYGEN SATURATION: 99 % | RESPIRATION RATE: 22 BRPM | SYSTOLIC BLOOD PRESSURE: 137 MMHG

## 2025-04-25 VITALS
SYSTOLIC BLOOD PRESSURE: 97 MMHG | RESPIRATION RATE: 14 BRPM | OXYGEN SATURATION: 100 % | DIASTOLIC BLOOD PRESSURE: 79 MMHG | HEART RATE: 64 BPM

## 2025-04-25 VITALS
SYSTOLIC BLOOD PRESSURE: 168 MMHG | DIASTOLIC BLOOD PRESSURE: 116 MMHG | HEART RATE: 62 BPM | RESPIRATION RATE: 16 BRPM | OXYGEN SATURATION: 96 %

## 2025-04-25 VITALS
DIASTOLIC BLOOD PRESSURE: 106 MMHG | HEART RATE: 62 BPM | SYSTOLIC BLOOD PRESSURE: 166 MMHG | RESPIRATION RATE: 16 BRPM | OXYGEN SATURATION: 92 %

## 2025-04-25 VITALS
DIASTOLIC BLOOD PRESSURE: 97 MMHG | SYSTOLIC BLOOD PRESSURE: 144 MMHG | RESPIRATION RATE: 18 BRPM | HEART RATE: 78 BPM | OXYGEN SATURATION: 96 %

## 2025-04-25 VITALS
TEMPERATURE: 97.6 F | OXYGEN SATURATION: 98 % | RESPIRATION RATE: 16 BRPM | HEART RATE: 72 BPM | DIASTOLIC BLOOD PRESSURE: 101 MMHG | SYSTOLIC BLOOD PRESSURE: 140 MMHG

## 2025-04-25 VITALS
SYSTOLIC BLOOD PRESSURE: 125 MMHG | DIASTOLIC BLOOD PRESSURE: 89 MMHG | OXYGEN SATURATION: 99 % | HEART RATE: 68 BPM | RESPIRATION RATE: 17 BRPM

## 2025-04-25 VITALS
RESPIRATION RATE: 20 BRPM | OXYGEN SATURATION: 94 % | SYSTOLIC BLOOD PRESSURE: 146 MMHG | DIASTOLIC BLOOD PRESSURE: 104 MMHG | HEART RATE: 84 BPM

## 2025-04-25 VITALS
OXYGEN SATURATION: 98 % | SYSTOLIC BLOOD PRESSURE: 126 MMHG | DIASTOLIC BLOOD PRESSURE: 103 MMHG | HEART RATE: 81 BPM | RESPIRATION RATE: 16 BRPM

## 2025-04-25 VITALS
OXYGEN SATURATION: 96 % | RESPIRATION RATE: 20 BRPM | DIASTOLIC BLOOD PRESSURE: 95 MMHG | SYSTOLIC BLOOD PRESSURE: 127 MMHG | HEART RATE: 99 BPM

## 2025-04-25 VITALS
SYSTOLIC BLOOD PRESSURE: 118 MMHG | TEMPERATURE: 98.2 F | HEART RATE: 119 BPM | DIASTOLIC BLOOD PRESSURE: 85 MMHG | OXYGEN SATURATION: 97 % | RESPIRATION RATE: 16 BRPM

## 2025-04-25 VITALS
HEART RATE: 70 BPM | DIASTOLIC BLOOD PRESSURE: 136 MMHG | RESPIRATION RATE: 18 BRPM | SYSTOLIC BLOOD PRESSURE: 199 MMHG | OXYGEN SATURATION: 93 %

## 2025-04-25 VITALS
DIASTOLIC BLOOD PRESSURE: 93 MMHG | RESPIRATION RATE: 18 BRPM | SYSTOLIC BLOOD PRESSURE: 156 MMHG | HEART RATE: 67 BPM | OXYGEN SATURATION: 93 %

## 2025-04-25 VITALS
HEART RATE: 99 BPM | DIASTOLIC BLOOD PRESSURE: 102 MMHG | RESPIRATION RATE: 22 BRPM | TEMPERATURE: 100 F | SYSTOLIC BLOOD PRESSURE: 137 MMHG | OXYGEN SATURATION: 99 %

## 2025-04-25 VITALS
HEART RATE: 94 BPM | OXYGEN SATURATION: 95 % | RESPIRATION RATE: 23 BRPM | DIASTOLIC BLOOD PRESSURE: 92 MMHG | TEMPERATURE: 98.6 F | SYSTOLIC BLOOD PRESSURE: 121 MMHG

## 2025-04-25 VITALS
OXYGEN SATURATION: 92 % | SYSTOLIC BLOOD PRESSURE: 130 MMHG | HEART RATE: 68 BPM | RESPIRATION RATE: 16 BRPM | DIASTOLIC BLOOD PRESSURE: 95 MMHG

## 2025-04-25 VITALS
SYSTOLIC BLOOD PRESSURE: 160 MMHG | OXYGEN SATURATION: 96 % | DIASTOLIC BLOOD PRESSURE: 110 MMHG | RESPIRATION RATE: 17 BRPM | HEART RATE: 75 BPM

## 2025-04-25 VITALS
RESPIRATION RATE: 22 BRPM | DIASTOLIC BLOOD PRESSURE: 113 MMHG | SYSTOLIC BLOOD PRESSURE: 116 MMHG | OXYGEN SATURATION: 92 % | HEART RATE: 74 BPM

## 2025-04-25 VITALS — DIASTOLIC BLOOD PRESSURE: 139 MMHG | SYSTOLIC BLOOD PRESSURE: 192 MMHG | HEART RATE: 70 BPM

## 2025-04-25 LAB
ALBUMIN SERPL-MCNC: 3.9 G/DL (ref 3.5–5)
ALP SERPL-CCNC: 53 U/L (ref 35–104)
ALT SERPL-CCNC: 38 U/L (ref ?–34)
ANION GAP SERPL CALC-SCNC: 12 MMOL/L (ref 5–15)
AST(SGOT): 37 U/L (ref ?–31)
BUN SERPL-MCNC: 5 MG/DL (ref 4–19)
BUN/CREAT SERPL: 7.9 RATIO (ref 10–20)
CALCIUM SERPL-MCNC: 9.1 MG/DL (ref 7.6–11)
CHLORIDE: 101 MMOL/L (ref 98–108)
ESTIMATED CREATININE CLEARANCE: 109.25 ML/MIN (ref 50–250)
GLOBULIN: 2.5 G/DL (ref 2.2–4.2)
GLUCOSE SERPL-MCNC: 140 MG/DL (ref 70–99)
MAGNESIUM SPEC-MCNC: 1.4 MG/DL (ref 1.5–2.2)
POTASSIUM SPEC-MCNC: 3.2 MMOL/L (ref 3.3–5.1)

## 2025-04-25 RX ADMIN — SODIUM CHLORIDE, PRESERVATIVE FREE 0 ML: 5 INJECTION INTRAVENOUS at 20:34

## 2025-04-25 RX ADMIN — SODIUM CHLORIDE, PRESERVATIVE FREE 0 ML: 5 INJECTION INTRAVENOUS at 00:24

## 2025-04-25 RX ADMIN — POTASSIUM CHLORIDE 100 MEQ: 7.46 INJECTION, SOLUTION INTRAVENOUS at 08:58

## 2025-04-25 RX ADMIN — DEXMEDETOMIDINE 1.4 MCG: 100 INJECTION, SOLUTION INTRAVENOUS at 21:00

## 2025-04-25 RX ADMIN — DEXMEDETOMIDINE 14.5 MCG: 100 INJECTION, SOLUTION INTRAVENOUS at 12:07

## 2025-04-25 RX ADMIN — DEXMEDETOMIDINE 25.3 MCG: 100 INJECTION, SOLUTION INTRAVENOUS at 02:18

## 2025-04-25 RX ADMIN — LORAZEPAM 2 MG: 2 INJECTION INTRAMUSCULAR; INTRAVENOUS at 00:24

## 2025-04-25 RX ADMIN — POTASSIUM CHLORIDE 100 MEQ: 7.46 INJECTION, SOLUTION INTRAVENOUS at 10:13

## 2025-04-26 VITALS
SYSTOLIC BLOOD PRESSURE: 136 MMHG | HEART RATE: 97 BPM | RESPIRATION RATE: 15 BRPM | OXYGEN SATURATION: 98 % | DIASTOLIC BLOOD PRESSURE: 97 MMHG

## 2025-04-26 VITALS
RESPIRATION RATE: 19 BRPM | SYSTOLIC BLOOD PRESSURE: 141 MMHG | HEART RATE: 116 BPM | TEMPERATURE: 98 F | OXYGEN SATURATION: 97 % | DIASTOLIC BLOOD PRESSURE: 108 MMHG

## 2025-04-26 VITALS
HEART RATE: 68 BPM | SYSTOLIC BLOOD PRESSURE: 100 MMHG | OXYGEN SATURATION: 94 % | RESPIRATION RATE: 13 BRPM | DIASTOLIC BLOOD PRESSURE: 69 MMHG

## 2025-04-26 VITALS
OXYGEN SATURATION: 98 % | TEMPERATURE: 97.8 F | SYSTOLIC BLOOD PRESSURE: 133 MMHG | HEART RATE: 78 BPM | DIASTOLIC BLOOD PRESSURE: 91 MMHG | RESPIRATION RATE: 17 BRPM

## 2025-04-26 VITALS
OXYGEN SATURATION: 97 % | DIASTOLIC BLOOD PRESSURE: 87 MMHG | TEMPERATURE: 97.9 F | RESPIRATION RATE: 22 BRPM | SYSTOLIC BLOOD PRESSURE: 107 MMHG | HEART RATE: 74 BPM

## 2025-04-26 VITALS
TEMPERATURE: 97 F | RESPIRATION RATE: 15 BRPM | DIASTOLIC BLOOD PRESSURE: 76 MMHG | OXYGEN SATURATION: 95 % | SYSTOLIC BLOOD PRESSURE: 106 MMHG | HEART RATE: 71 BPM

## 2025-04-26 VITALS
DIASTOLIC BLOOD PRESSURE: 76 MMHG | HEART RATE: 62 BPM | SYSTOLIC BLOOD PRESSURE: 97 MMHG | OXYGEN SATURATION: 94 % | RESPIRATION RATE: 15 BRPM

## 2025-04-26 VITALS
HEART RATE: 62 BPM | DIASTOLIC BLOOD PRESSURE: 76 MMHG | OXYGEN SATURATION: 94 % | RESPIRATION RATE: 12 BRPM | SYSTOLIC BLOOD PRESSURE: 106 MMHG

## 2025-04-26 VITALS
SYSTOLIC BLOOD PRESSURE: 84 MMHG | DIASTOLIC BLOOD PRESSURE: 57 MMHG | OXYGEN SATURATION: 93 % | RESPIRATION RATE: 15 BRPM | HEART RATE: 75 BPM

## 2025-04-26 VITALS
DIASTOLIC BLOOD PRESSURE: 70 MMHG | HEART RATE: 64 BPM | RESPIRATION RATE: 14 BRPM | SYSTOLIC BLOOD PRESSURE: 96 MMHG | OXYGEN SATURATION: 95 %

## 2025-04-26 VITALS
TEMPERATURE: 98 F | HEART RATE: 60 BPM | OXYGEN SATURATION: 94 % | DIASTOLIC BLOOD PRESSURE: 80 MMHG | RESPIRATION RATE: 13 BRPM | SYSTOLIC BLOOD PRESSURE: 113 MMHG

## 2025-04-26 VITALS — HEART RATE: 110 BPM

## 2025-04-26 VITALS
DIASTOLIC BLOOD PRESSURE: 89 MMHG | RESPIRATION RATE: 13 BRPM | OXYGEN SATURATION: 94 % | SYSTOLIC BLOOD PRESSURE: 117 MMHG | HEART RATE: 68 BPM

## 2025-04-26 VITALS — HEART RATE: 76 BPM

## 2025-04-26 VITALS
RESPIRATION RATE: 16 BRPM | HEART RATE: 112 BPM | OXYGEN SATURATION: 98 % | SYSTOLIC BLOOD PRESSURE: 140 MMHG | DIASTOLIC BLOOD PRESSURE: 102 MMHG

## 2025-04-26 LAB
ALBUMIN SERPL-MCNC: 3.5 G/DL (ref 3.5–5)
ALP SERPL-CCNC: 48 U/L (ref 35–104)
ALT SERPL-CCNC: 33 U/L (ref ?–34)
ANION GAP SERPL CALC-SCNC: 11 MMOL/L (ref 5–15)
AST(SGOT): 37 U/L (ref ?–31)
BILIRUB DIRECT SERPL-MCNC: 0.17 MG/DL (ref 0–0.3)
BUN SERPL-MCNC: 4 MG/DL (ref 4–19)
BUN/CREAT SERPL: 6.9 RATIO (ref 10–20)
CALCIUM SERPL-MCNC: 8.4 MG/DL (ref 7.6–11)
CHLORIDE: 98 MMOL/L (ref 98–108)
CO2 BLDCV-SCNC: 23.9 MMOL/L (ref 21–32)
DEPRECATED RDW RBC: 46.7 FL (ref 35.1–43.9)
ERYTHROCYTE [DISTWIDTH] IN BLOOD: 13.1 % (ref 11.6–14.6)
GLOBULIN: 2.3 G/DL (ref 2.2–4.2)
GLUCOSE SERPL-MCNC: 100 MG/DL (ref 70–99)
HCT VFR BLD AUTO: 33.6 % (ref 37–47)
HGB BLD-MCNC: 12.2 G/DL (ref 12–15)
MAGNESIUM SPEC-MCNC: 1.3 MG/DL (ref 1.5–2.2)
MCV RBC: 98.2 FL (ref 81–99)
MEAN CORP HGB CONC: 36.3 G/DL (ref 32–36)
NRBC FLAGGED BY ANALYZER: 0 % (ref 0–5)
PLATELET # BLD: 146 K/MM3 (ref 150–450)
POTASSIUM SPEC-MCNC: 3.1 MMOL/L (ref 3.3–5.1)
RBC # BLD AUTO: 3.42 M/MM3 (ref 4.2–5.4)
WBC # BLD AUTO: 8.8 K/MM3 (ref 4.4–11)

## 2025-04-26 RX ADMIN — NALTREXONE 380 MG: KIT at 09:39

## 2025-04-26 RX ADMIN — SODIUM CHLORIDE, PRESERVATIVE FREE 0 ML: 5 INJECTION INTRAVENOUS at 06:23

## 2025-04-26 RX ADMIN — Medication 100 MG: at 08:14

## 2025-04-26 RX ADMIN — SODIUM CHLORIDE, PRESERVATIVE FREE 0 ML: 5 INJECTION INTRAVENOUS at 08:12

## 2025-04-26 RX ADMIN — MAGNESIUM SULFATE HEPTAHYDRATE 25 GM: 4 INJECTION, SOLUTION INTRAVENOUS at 08:14

## 2025-04-26 RX ADMIN — POTASSIUM CHLORIDE 40 MEQ: 1500 TABLET, EXTENDED RELEASE ORAL at 08:08

## 2025-04-26 RX ADMIN — POTASSIUM CHLORIDE 40 MEQ: 1500 TABLET, EXTENDED RELEASE ORAL at 11:43

## 2025-05-07 ENCOUNTER — APPOINTMENT (OUTPATIENT)
Dept: PRIMARY CARE | Facility: CLINIC | Age: 45
End: 2025-05-07
Payer: COMMERCIAL

## 2025-05-07 VITALS
HEART RATE: 67 BPM | OXYGEN SATURATION: 100 % | DIASTOLIC BLOOD PRESSURE: 91 MMHG | SYSTOLIC BLOOD PRESSURE: 128 MMHG | WEIGHT: 152.4 LBS | BODY MASS INDEX: 28.33 KG/M2

## 2025-05-07 DIAGNOSIS — F10.280 ALCOHOL DEPENDENCE WITH ALCOHOL-INDUCED ANXIETY DISORDER (MULTI): Primary | ICD-10-CM

## 2025-05-07 DIAGNOSIS — R07.89 OTHER CHEST PAIN: ICD-10-CM

## 2025-05-07 DIAGNOSIS — R00.0 TACHYCARDIA: ICD-10-CM

## 2025-05-07 DIAGNOSIS — I15.8 OTHER SECONDARY HYPERTENSION: ICD-10-CM

## 2025-05-07 PROCEDURE — 3074F SYST BP LT 130 MM HG: CPT | Performed by: NURSE PRACTITIONER

## 2025-05-07 PROCEDURE — 3080F DIAST BP >= 90 MM HG: CPT | Performed by: NURSE PRACTITIONER

## 2025-05-07 PROCEDURE — 1036F TOBACCO NON-USER: CPT | Performed by: NURSE PRACTITIONER

## 2025-05-07 PROCEDURE — 99214 OFFICE O/P EST MOD 30 MIN: CPT | Performed by: NURSE PRACTITIONER

## 2025-05-07 RX ORDER — LOSARTAN POTASSIUM 50 MG/1
50 TABLET ORAL DAILY
Qty: 90 TABLET | Refills: 1 | Status: SHIPPED | OUTPATIENT
Start: 2025-05-07 | End: 2025-08-05

## 2025-05-07 RX ORDER — POTASSIUM CHLORIDE 20 MEQ/1
20 TABLET, EXTENDED RELEASE ORAL DAILY
COMMUNITY
Start: 2025-04-26

## 2025-05-07 RX ORDER — PANTOPRAZOLE SODIUM 40 MG/1
40 TABLET, DELAYED RELEASE ORAL DAILY
Qty: 30 TABLET | Refills: 1 | Status: SHIPPED | OUTPATIENT
Start: 2025-05-07 | End: 2025-07-06

## 2025-05-07 RX ORDER — LANOLIN ALCOHOL/MO/W.PET/CERES
100 CREAM (GRAM) TOPICAL DAILY
COMMUNITY
Start: 2025-04-26 | End: 2025-05-26

## 2025-05-07 RX ORDER — FOLIC ACID 1 MG/1
1 TABLET ORAL DAILY
COMMUNITY
Start: 2025-04-26 | End: 2025-05-07 | Stop reason: WASHOUT

## 2025-05-07 ASSESSMENT — ENCOUNTER SYMPTOMS
ABDOMINAL PAIN: 0
CHILLS: 0
WHEEZING: 0
NEUROLOGICAL NEGATIVE: 1
CONSTIPATION: 0
ABDOMINAL DISTENTION: 0
FEVER: 0
NAUSEA: 0
DIARRHEA: 0
AGITATION: 0
NERVOUS/ANXIOUS: 0
VOMITING: 0
PALPITATIONS: 0
DYSPHORIC MOOD: 0
COUGH: 0
SLEEP DISTURBANCE: 0
SHORTNESS OF BREATH: 0

## 2025-05-07 NOTE — PROGRESS NOTES
Subjective   Patient ID: Socorro Valderrama is a 44 y.o. female who presents for Med Check (Detox at hospital).  43 yo female presents for post hospital follow up.  Pt was admitted to [facility] 4-22-25 in the detox unit for alcohol dependence and was discharged on 4-26-25.  She did have some hallucinations while in the detox unit and was then placed in ICU.  She was given a Vivitrol injection during her hospital stay.  Pt states she has not had any SE with her Vivitrol and she is doing well on this injection.  States she has not had any cravings for alcohol and has not had any alcohol since her injection.  She reports her  has also stopped drinking.  She would like to continue Vivitrol at this establishment.  Her BP had become elevated while in hospital and her Losartan was changed to 50mg 1/2 tablet bid; second tablet prn.   She also continues with the clonidine.  States she was also given K+ while in the hospital.  She is no longer taking her folic acid, K+ or thiamine.  Reports she has had some CP on and off previously and went to ER and everything was normal; she was informed it was anxiety due to the alcohol use.   She has had one episode of aching across chest, not lasting long.  Denies pain radiating and no other cardio-resp symptoms.  She would like to start exercising.  Mentioned her HR is going up but less than 120.  Asymptomatic.          Review of Systems   Constitutional:  Negative for chills and fever.   Respiratory:  Negative for cough, shortness of breath and wheezing.    Cardiovascular:  Negative for chest pain and palpitations.   Gastrointestinal:  Negative for abdominal distention, abdominal pain, constipation, diarrhea, nausea and vomiting.   Genitourinary: Negative.    Neurological: Negative.    Psychiatric/Behavioral:  Negative for agitation, dysphoric mood and sleep disturbance. The patient is not nervous/anxious.        Objective   Physical Exam  Vitals and nursing note  reviewed.   Constitutional:       General: She is not in acute distress.  HENT:      Head: Normocephalic.   Cardiovascular:      Rate and Rhythm: Normal rate and regular rhythm.      Pulses: Normal pulses.      Heart sounds: Normal heart sounds. No murmur heard.     No friction rub. No gallop.   Pulmonary:      Effort: Pulmonary effort is normal.      Breath sounds: Normal breath sounds. No wheezing, rhonchi or rales.   Abdominal:      General: Bowel sounds are normal.   Musculoskeletal:      Right lower leg: No edema.      Left lower leg: No edema.   Skin:     General: Skin is warm and dry.      Findings: Bruising present.      Comments: Noted bruising on right forearm.     Neurological:      Mental Status: She is alert and oriented to person, place, and time.   Psychiatric:         Mood and Affect: Mood normal.       BP (!) 128/91   Pulse 67   Wt 69.1 kg (152 lb 6.4 oz)   LMP 04/01/2025 (Approximate)   SpO2 100%   BMI 28.33 kg/m²     Current Medications[1]   Medical History[2]   Surgical History[3]     Family History[4]     Assessment/Plan   Problem List Items Addressed This Visit    None  Visit Diagnoses         Alcohol dependence with alcohol-induced anxiety disorder (Multi)    -  Primary    Relevant Medications    pantoprazole (ProtoNix) 40 mg EC tablet    Other Relevant Orders    Basic metabolic panel    Vitamin B1, Whole Blood    Folate      Tachycardia        Relevant Medications    losartan (Cozaar) 50 mg tablet    Other Relevant Orders    Basic metabolic panel    Vitamin B1, Whole Blood    Folate      Other secondary hypertension        Relevant Medications    losartan (Cozaar) 50 mg tablet      Other chest pain        Relevant Medications    pantoprazole (ProtoNix) 40 mg EC tablet        She will continue Losartan at 50mg daily for her htn.  She is to purchase a BP cuff at home and monitor.  Get labs today to follow up on her hypokalemia.    Recommend continuing Vivitrol 380mg IM injection gluteal;  next injection should be in left gluteal.  She will need drug screening and recommended screening labs.   Also recommend counseling.  Will discuss with her PCP as she would prefer to have her injections at [facility].           [1]   Current Outpatient Medications:     cloNIDine (Catapres) 0.1 mg tablet, Take 1 tablet (0.1 mg) by mouth 3 times a day., Disp: 180 tablet, Rfl: 1    miscellaneous medical supply (Blood Pressure Cuff) misc, 1 each 2 times a day. Antecubital cuff, Disp: 1 each, Rfl: 0    multivitamin tablet, Take 1 tablet by mouth once daily., Disp: , Rfl:     naproxen sodium (Aleve) 220 mg capsule, Take 1 tablet by mouth if needed. OTC, Disp: , Rfl:     potassium chloride CR 20 mEq ER tablet, Take 1 tablet (20 mEq) by mouth once daily., Disp: , Rfl:     thiamine 100 mg tablet, Take 1 tablet (100 mg) by mouth once daily., Disp: , Rfl:     losartan (Cozaar) 50 mg tablet, Take 1 tablet (50 mg) by mouth once daily., Disp: 90 tablet, Rfl: 1    magnesium 250 mg tablet, Take 1 tablet (250 mg) by mouth once daily. (Patient not taking: Reported on 5/7/2025), Disp: 90 tablet, Rfl: 0    pantoprazole (ProtoNix) 40 mg EC tablet, Take 1 tablet (40 mg) by mouth once daily. Do not crush, chew, or split., Disp: 30 tablet, Rfl: 1  [2]   Past Medical History:  Diagnosis Date    Encounter for full-term uncomplicated delivery     Normal vaginal delivery    Encounter for gynecological examination (general) (routine) without abnormal findings     Pap test, as part of routine gynecological examination    Other conditions influencing health status     Menstruation    Personal history of other complications of pregnancy, childbirth and the puerperium     H/O miscarriage, not currently pregnant   [3]   Past Surgical History:  Procedure Laterality Date    OTHER SURGICAL HISTORY  03/27/2020    Surgery    OTHER SURGICAL HISTORY  03/27/2020    Lumpectomy    OTHER SURGICAL HISTORY Left     knee, 2 years ago   [4]   Family History  Problem  Relation Name Age of Onset    Hepatitis Father      Diabetes Maternal Grandfather      Breast cancer Paternal Grandmother      Cancer Other      Diabetes Other

## 2025-05-08 LAB
ANION GAP SERPL CALCULATED.4IONS-SCNC: 13 MMOL/L (CALC) (ref 7–17)
BUN SERPL-MCNC: 7 MG/DL (ref 7–25)
BUN/CREAT SERPL: NORMAL (CALC) (ref 6–22)
CALCIUM SERPL-MCNC: 9.5 MG/DL (ref 8.6–10.2)
CHLORIDE SERPL-SCNC: 100 MMOL/L (ref 98–110)
CO2 SERPL-SCNC: 24 MMOL/L (ref 20–32)
CREAT SERPL-MCNC: 0.68 MG/DL (ref 0.5–0.99)
EGFRCR SERPLBLD CKD-EPI 2021: 111 ML/MIN/1.73M2
FOLATE SERPL-MCNC: 12.9 NG/ML
GLUCOSE SERPL-MCNC: 83 MG/DL (ref 65–99)
POTASSIUM SERPL-SCNC: 4.2 MMOL/L (ref 3.5–5.3)
SODIUM SERPL-SCNC: 137 MMOL/L (ref 135–146)
VIT B1 BLD-SCNC: NORMAL NMOL/L

## 2025-05-13 LAB
ANION GAP SERPL CALCULATED.4IONS-SCNC: 13 MMOL/L (CALC) (ref 7–17)
BUN SERPL-MCNC: 7 MG/DL (ref 7–25)
BUN/CREAT SERPL: NORMAL (CALC) (ref 6–22)
CALCIUM SERPL-MCNC: 9.5 MG/DL (ref 8.6–10.2)
CHLORIDE SERPL-SCNC: 100 MMOL/L (ref 98–110)
CO2 SERPL-SCNC: 24 MMOL/L (ref 20–32)
CREAT SERPL-MCNC: 0.68 MG/DL (ref 0.5–0.99)
EGFRCR SERPLBLD CKD-EPI 2021: 111 ML/MIN/1.73M2
FOLATE SERPL-MCNC: 12.9 NG/ML
GLUCOSE SERPL-MCNC: 83 MG/DL (ref 65–99)
POTASSIUM SERPL-SCNC: 4.2 MMOL/L (ref 3.5–5.3)
SODIUM SERPL-SCNC: 137 MMOL/L (ref 135–146)
VIT B1 BLD-SCNC: 178 NMOL/L (ref 78–185)

## 2025-05-15 ENCOUNTER — APPOINTMENT (OUTPATIENT)
Facility: CLINIC | Age: 45
End: 2025-05-15
Payer: COMMERCIAL

## 2025-05-27 ENCOUNTER — APPOINTMENT (OUTPATIENT)
Dept: PRIMARY CARE | Facility: CLINIC | Age: 45
End: 2025-05-27
Payer: COMMERCIAL

## 2025-05-27 VITALS
DIASTOLIC BLOOD PRESSURE: 86 MMHG | SYSTOLIC BLOOD PRESSURE: 123 MMHG | BODY MASS INDEX: 28.18 KG/M2 | WEIGHT: 151.6 LBS | HEART RATE: 60 BPM | OXYGEN SATURATION: 100 %

## 2025-05-27 DIAGNOSIS — F10.280 ALCOHOL DEPENDENCE WITH ALCOHOL-INDUCED ANXIETY DISORDER (MULTI): ICD-10-CM

## 2025-05-27 DIAGNOSIS — I15.8 OTHER SECONDARY HYPERTENSION: ICD-10-CM

## 2025-05-27 DIAGNOSIS — K21.9 GASTROESOPHAGEAL REFLUX DISEASE WITHOUT ESOPHAGITIS: ICD-10-CM

## 2025-05-27 DIAGNOSIS — R07.89 OTHER CHEST PAIN: Primary | ICD-10-CM

## 2025-05-27 DIAGNOSIS — R00.0 TACHYCARDIA: ICD-10-CM

## 2025-05-27 PROCEDURE — 1036F TOBACCO NON-USER: CPT | Performed by: NURSE PRACTITIONER

## 2025-05-27 PROCEDURE — 99213 OFFICE O/P EST LOW 20 MIN: CPT | Performed by: NURSE PRACTITIONER

## 2025-05-27 PROCEDURE — 3079F DIAST BP 80-89 MM HG: CPT | Performed by: NURSE PRACTITIONER

## 2025-05-27 PROCEDURE — 3074F SYST BP LT 130 MM HG: CPT | Performed by: NURSE PRACTITIONER

## 2025-05-27 RX ORDER — PANTOPRAZOLE SODIUM 40 MG/1
40 TABLET, DELAYED RELEASE ORAL DAILY
Qty: 30 TABLET | Refills: 3 | Status: SHIPPED | OUTPATIENT
Start: 2025-05-27 | End: 2025-07-26

## 2025-05-27 ASSESSMENT — ENCOUNTER SYMPTOMS
ABDOMINAL DISTENTION: 0
DIAPHORESIS: 0
FEVER: 0
PALPITATIONS: 0
WHEEZING: 0
SHORTNESS OF BREATH: 0
COUGH: 0
APPETITE CHANGE: 0
VOMITING: 0
ABDOMINAL PAIN: 0
CONSTIPATION: 0
NAUSEA: 0
DIARRHEA: 0

## 2025-05-27 NOTE — PROGRESS NOTES
Subjective   Patient ID: Socorro Valderrama is a 44 y.o. female who presents for Follow-up (BP check).  45 yo female presents today for follow up of HTN.  She is currently taking Losartan 50mg daily and clonidine 0.1mg tid.  Continues to have some mid chest pain, at times can be in right or left chest.  No radiation to jaw, neck, or arms.  Denies paresthesia of arms.  At times she can have pain in mid back.  Cardiac workup was performed at [facility] and was told all was negative.  Denies sob, diaphoresis.  She is due for a Vivitrol injection.   Denies any cravings of alcohol.  States she was thinking about when she drank every night and told herself she would be miserable if she restarted and would not be healthy.  Continues on Pantoprazole.         Review of Systems   Constitutional:  Negative for appetite change, diaphoresis and fever.   Respiratory:  Negative for cough, shortness of breath and wheezing.    Cardiovascular:  Positive for chest pain. Negative for palpitations and leg swelling.   Gastrointestinal:  Negative for abdominal distention, abdominal pain, constipation, diarrhea, nausea and vomiting.       Objective   Physical Exam  Vitals and nursing note reviewed.   Constitutional:       General: She is not in acute distress.  HENT:      Head: Normocephalic.   Cardiovascular:      Rate and Rhythm: Normal rate and regular rhythm.      Pulses: Normal pulses.      Heart sounds: Normal heart sounds. No murmur heard.     No friction rub. No gallop.   Pulmonary:      Effort: Pulmonary effort is normal.      Breath sounds: Normal breath sounds. No wheezing, rhonchi or rales.   Abdominal:      General: Bowel sounds are normal.   Musculoskeletal:      Right lower leg: No edema.      Left lower leg: No edema.   Skin:     General: Skin is warm and dry.   Neurological:      Mental Status: She is alert.   Psychiatric:         Mood and Affect: Mood normal.       /86   Pulse 60   Wt 68.8 kg (151 lb 9.6 oz)   LMP  04/01/2025 (Approximate)   SpO2 100%   BMI 28.18 kg/m²     Current Medications[1]   Medical History[2]   Surgical History[3]     Family History[4]     Assessment/Plan   Problem List Items Addressed This Visit    None  Visit Diagnoses         Other chest pain    -  Primary    Relevant Medications    pantoprazole (ProtoNix) 40 mg EC tablet    Other Relevant Orders    US abdomen complete      Gastroesophageal reflux disease without esophagitis        Relevant Orders    US abdomen complete      Tachycardia          Other secondary hypertension          Alcohol dependence with alcohol-induced anxiety disorder (Multi)        Relevant Medications    pantoprazole (ProtoNix) 40 mg EC tablet    naltrexone ER (Vivitrol) injection 380 mg (Start on 5/27/2025  3:30 PM)                 [1]   Current Outpatient Medications:     cloNIDine (Catapres) 0.1 mg tablet, Take 1 tablet (0.1 mg) by mouth 3 times a day., Disp: 180 tablet, Rfl: 1    losartan (Cozaar) 50 mg tablet, Take 1 tablet (50 mg) by mouth once daily., Disp: 90 tablet, Rfl: 1    magnesium 250 mg tablet, Take 1 tablet (250 mg) by mouth once daily., Disp: 90 tablet, Rfl: 0    miscellaneous medical supply (Blood Pressure Cuff) misc, 1 each 2 times a day. Antecubital cuff, Disp: 1 each, Rfl: 0    multivitamin tablet, Take 1 tablet by mouth once daily., Disp: , Rfl:     naproxen sodium (Aleve) 220 mg capsule, Take 1 tablet by mouth if needed. OTC, Disp: , Rfl:     potassium chloride CR 20 mEq ER tablet, Take 1 tablet (20 mEq) by mouth once daily., Disp: , Rfl:     thiamine 100 mg tablet, Take 1 tablet (100 mg) by mouth once daily., Disp: , Rfl:     pantoprazole (ProtoNix) 40 mg EC tablet, Take 1 tablet (40 mg) by mouth once daily. Do not crush, chew, or split., Disp: 30 tablet, Rfl: 3    Current Facility-Administered Medications:     naltrexone ER (Vivitrol) injection 380 mg, 380 mg, intramuscular, Once, LACIE Franco  [2]   Past Medical History:  Diagnosis Date     Encounter for full-term uncomplicated delivery     Normal vaginal delivery    Encounter for gynecological examination (general) (routine) without abnormal findings     Pap test, as part of routine gynecological examination    Other conditions influencing health status     Menstruation    Personal history of other complications of pregnancy, childbirth and the puerperium     H/O miscarriage, not currently pregnant   [3]   Past Surgical History:  Procedure Laterality Date    OTHER SURGICAL HISTORY  03/27/2020    Surgery    OTHER SURGICAL HISTORY  03/27/2020    Lumpectomy    OTHER SURGICAL HISTORY Left     knee, 2 years ago   [4]   Family History  Problem Relation Name Age of Onset    Hepatitis Father      Diabetes Maternal Grandfather      Breast cancer Paternal Grandmother      Cancer Other      Diabetes Other

## 2025-05-29 NOTE — TELEPHONE ENCOUNTER
The previous order you placed for patients Vivitrol was for a clinic administer. This needs to be sent to the pharmacy I have pended the medication, you will need to change dosage and information for the scripts. Also, please cancel order for Tuesday visit for the office administered order. Thanks!

## 2025-05-30 DIAGNOSIS — F10.10 ALCOHOL ABUSE: Primary | ICD-10-CM

## 2025-06-03 ENCOUNTER — HOSPITAL ENCOUNTER (OUTPATIENT)
Dept: RADIOLOGY | Facility: HOSPITAL | Age: 45
Discharge: HOME | End: 2025-06-03
Payer: COMMERCIAL

## 2025-06-03 DIAGNOSIS — R07.89 OTHER CHEST PAIN: ICD-10-CM

## 2025-06-03 DIAGNOSIS — K21.9 GASTROESOPHAGEAL REFLUX DISEASE WITHOUT ESOPHAGITIS: ICD-10-CM

## 2025-06-03 PROCEDURE — 76700 US EXAM ABDOM COMPLETE: CPT | Performed by: RADIOLOGY

## 2025-06-03 PROCEDURE — 76700 US EXAM ABDOM COMPLETE: CPT

## 2025-06-05 ENCOUNTER — APPOINTMENT (OUTPATIENT)
Facility: CLINIC | Age: 45
End: 2025-06-05
Payer: COMMERCIAL

## 2025-06-05 VITALS
WEIGHT: 151.8 LBS | HEIGHT: 62 IN | SYSTOLIC BLOOD PRESSURE: 112 MMHG | DIASTOLIC BLOOD PRESSURE: 70 MMHG | BODY MASS INDEX: 27.94 KG/M2

## 2025-06-05 DIAGNOSIS — Z01.419 ENCOUNTER FOR GYNECOLOGICAL EXAMINATION WITHOUT ABNORMAL FINDING: ICD-10-CM

## 2025-06-05 DIAGNOSIS — Z12.31 ENCOUNTER FOR SCREENING MAMMOGRAM FOR MALIGNANT NEOPLASM OF BREAST: ICD-10-CM

## 2025-06-05 DIAGNOSIS — Z12.4 SCREENING FOR CERVICAL CANCER: ICD-10-CM

## 2025-06-05 PROCEDURE — 87626 HPV SEP HI-RSK TYP&POOL RSLT: CPT

## 2025-06-05 PROCEDURE — 88175 CYTOPATH C/V AUTO FLUID REDO: CPT

## 2025-06-05 PROCEDURE — 3008F BODY MASS INDEX DOCD: CPT | Performed by: REGISTERED NURSE

## 2025-06-05 PROCEDURE — 1036F TOBACCO NON-USER: CPT | Performed by: REGISTERED NURSE

## 2025-06-05 PROCEDURE — 99386 PREV VISIT NEW AGE 40-64: CPT | Performed by: REGISTERED NURSE

## 2025-06-05 SDOH — ECONOMIC STABILITY: TRANSPORTATION INSECURITY
IN THE PAST 12 MONTHS, HAS THE LACK OF TRANSPORTATION KEPT YOU FROM MEDICAL APPOINTMENTS OR FROM GETTING MEDICATIONS?: NO

## 2025-06-05 SDOH — ECONOMIC STABILITY: TRANSPORTATION INSECURITY
IN THE PAST 12 MONTHS, HAS LACK OF TRANSPORTATION KEPT YOU FROM MEETINGS, WORK, OR FROM GETTING THINGS NEEDED FOR DAILY LIVING?: NO

## 2025-06-05 ASSESSMENT — ENCOUNTER SYMPTOMS
PSYCHIATRIC NEGATIVE: 1
CONSTITUTIONAL NEGATIVE: 1

## 2025-06-05 ASSESSMENT — COLUMBIA-SUICIDE SEVERITY RATING SCALE - C-SSRS
6. HAVE YOU EVER DONE ANYTHING, STARTED TO DO ANYTHING, OR PREPARED TO DO ANYTHING TO END YOUR LIFE?: NO
2. HAVE YOU ACTUALLY HAD ANY THOUGHTS OF KILLING YOURSELF?: NO
1. IN THE PAST MONTH, HAVE YOU WISHED YOU WERE DEAD OR WISHED YOU COULD GO TO SLEEP AND NOT WAKE UP?: NO

## 2025-06-05 ASSESSMENT — ANXIETY QUESTIONNAIRES
2. NOT BEING ABLE TO STOP OR CONTROL WORRYING: NOT AT ALL
IF YOU CHECKED OFF ANY PROBLEMS ON THIS QUESTIONNAIRE, HOW DIFFICULT HAVE THESE PROBLEMS MADE IT FOR YOU TO DO YOUR WORK, TAKE CARE OF THINGS AT HOME, OR GET ALONG WITH OTHER PEOPLE: NOT DIFFICULT AT ALL
3. WORRYING TOO MUCH ABOUT DIFFERENT THINGS: NOT AT ALL
7. FEELING AFRAID AS IF SOMETHING AWFUL MIGHT HAPPEN: NOT AT ALL
4. TROUBLE RELAXING: NOT AT ALL
5. BEING SO RESTLESS THAT IT IS HARD TO SIT STILL: NOT AT ALL
1. FEELING NERVOUS, ANXIOUS, OR ON EDGE: NOT AT ALL
GAD7 TOTAL SCORE: 0
6. BECOMING EASILY ANNOYED OR IRRITABLE: NOT AT ALL

## 2025-06-05 ASSESSMENT — PATIENT HEALTH QUESTIONNAIRE - PHQ9
2. FEELING DOWN, DEPRESSED OR HOPELESS: NOT AT ALL
1. LITTLE INTEREST OR PLEASURE IN DOING THINGS: NOT AT ALL
SUM OF ALL RESPONSES TO PHQ9 QUESTIONS 1 AND 2: 0

## 2025-06-05 ASSESSMENT — LIFESTYLE VARIABLES
AUDIT-C TOTAL SCORE: 0
SKIP TO QUESTIONS 9-10: 1
HOW MANY STANDARD DRINKS CONTAINING ALCOHOL DO YOU HAVE ON A TYPICAL DAY: PATIENT DOES NOT DRINK
HOW OFTEN DO YOU HAVE A DRINK CONTAINING ALCOHOL: NEVER
HOW OFTEN DO YOU HAVE SIX OR MORE DRINKS ON ONE OCCASION: NEVER

## 2025-06-05 ASSESSMENT — PAIN SCALES - GENERAL: PAINLEVEL_OUTOF10: 0-NO PAIN

## 2025-06-05 NOTE — PROGRESS NOTES
Subjective   Patient ID: Socorro Valderrama is a 44 y.o. female who presents for No chief complaint on file..  HPI  Pt. Presents for annual. Due for pap, never had mammogram. Wants to resume contraception, has used OCP in the past, does not want LARC. Migraine without aura once every few years, currently on medication to manage HTN that seems to have improved significantly since pt. Discontinued ETOH. Has follow up for this next month.   Review of Systems   Constitutional: Negative.    Genitourinary: Negative.    Psychiatric/Behavioral: Negative.         Objective   Physical Exam  Constitutional:       Appearance: Normal appearance.   Pulmonary:      Effort: Pulmonary effort is normal.   Neurological:      General: No focal deficit present.      Mental Status: She is alert and oriented to person, place, and time.   Psychiatric:         Mood and Affect: Mood normal.         Behavior: Behavior normal.         Assessment/Plan        Pap obtained  Schedule mammogram  Disc. Using POP until pt. Has HTN follow up and would consider OCP based on outcome of that follow up. Pt. Verbalized understanding of dosage timing  Rx POP    Kia Cee, JOSE ANTONIO-CNM, JOSE ANTONIO-CNP, DNP 06/05/25 2:11 PM

## 2025-06-11 ENCOUNTER — HOSPITAL ENCOUNTER (OUTPATIENT)
Dept: RADIOLOGY | Facility: CLINIC | Age: 45
Discharge: HOME | End: 2025-06-11
Payer: COMMERCIAL

## 2025-06-11 VITALS — BODY MASS INDEX: 27.94 KG/M2 | HEIGHT: 62 IN | WEIGHT: 151.8 LBS

## 2025-06-11 DIAGNOSIS — Z12.31 ENCOUNTER FOR SCREENING MAMMOGRAM FOR MALIGNANT NEOPLASM OF BREAST: ICD-10-CM

## 2025-06-11 PROCEDURE — 77067 SCR MAMMO BI INCL CAD: CPT | Performed by: RADIOLOGY

## 2025-06-11 PROCEDURE — 77067 SCR MAMMO BI INCL CAD: CPT

## 2025-06-11 PROCEDURE — 77063 BREAST TOMOSYNTHESIS BI: CPT | Performed by: RADIOLOGY

## 2025-06-26 ENCOUNTER — APPOINTMENT (OUTPATIENT)
Dept: PRIMARY CARE | Facility: CLINIC | Age: 45
End: 2025-06-26
Payer: COMMERCIAL

## 2025-06-26 VITALS
WEIGHT: 150.3 LBS | HEART RATE: 64 BPM | OXYGEN SATURATION: 100 % | SYSTOLIC BLOOD PRESSURE: 110 MMHG | DIASTOLIC BLOOD PRESSURE: 68 MMHG | BODY MASS INDEX: 27.94 KG/M2

## 2025-06-26 DIAGNOSIS — R07.89 OTHER CHEST PAIN: Primary | ICD-10-CM

## 2025-06-26 PROCEDURE — 99214 OFFICE O/P EST MOD 30 MIN: CPT | Performed by: FAMILY MEDICINE

## 2025-06-26 RX ORDER — FOLIC ACID 1 MG/1
1 TABLET ORAL DAILY
COMMUNITY
Start: 2025-06-18

## 2025-06-26 NOTE — PROGRESS NOTES
Subjective   Patient ID: Socorro Valderrama is a 44 y.o. female who presents for Follow-up (1 mo; tachycardia) and GERD (1 mo; US abdomen was normal ( if symptoms return will need a EDG)).  HPI  No data recorded         Other chest pain    -  Primary      Relevant Medications     pantoprazole (ProtoNix) 40 mg EC tablet     Other Relevant Orders     US abdomen complete       Gastroesophageal reflux disease without esophagitis         Relevant Orders     US abdomen complete       Tachycardia           Other secondary hypertension           Alcohol dependence with alcohol-induced anxiety disorder (Multi)         Relevant Medications     pantoprazole (ProtoNix) 40 mg EC tablet     naltrexone ER (Vivitrol) injection 380 mg (Start on 5/27/2025  3:30 PM)       Sporadic Chest Pain     Not painful comes and goes     Complete abd US was normal     Migratory 1/10 lasting seconds to minutes    No sharp dull ache not assoc symptoms and not related to food or activity    Protonix still gets some symptoms     30 to 40 % better     CT chest 9/2024    Stopped April 2025       Anxiety     Less anxious      Heart rate racing is less      Social anxiety with new places      Public speaking      Pt states not drinking             Can exercise without     Never got vivitrol approved     Running couch to 5k         Review of Systems    Objective   Visit Vitals  /68   Pulse 64   Wt 68.2 kg (150 lb 4.8 oz)   LMP 05/10/2025 (Exact Date)   SpO2 100%   BMI 27.94 kg/m²   Smoking Status Never   BSA 1.72 m²       Physical Exam  Vitals reviewed.   Constitutional:       Appearance: Normal appearance.   HENT:      Head: Normocephalic and atraumatic.   Eyes:      Conjunctiva/sclera: Conjunctivae normal.   Cardiovascular:      Rate and Rhythm: Normal rate and regular rhythm.   Pulmonary:      Effort: Pulmonary effort is normal.      Breath sounds: Normal breath sounds.   Musculoskeletal:      Cervical back: Neck supple.   Skin:     General: Skin is  warm and dry.   Neurological:      General: No focal deficit present.      Mental Status: She is alert and oriented to person, place, and time.   Psychiatric:         Mood and Affect: Mood normal.         Behavior: Behavior normal.         Thought Content: Thought content normal.         Judgment: Judgment normal.              Assessment/Plan   Diagnoses and all orders for this visit:  Other chest pain  -     Stress Test; Future    Patient will stop her Protonix since this is not classic GERD and she still having symptoms after 1 month of treatment.     Testing so far is inconclusive as the source of her chest pain.    Bryce Dobbins MD 06/26/25 11:46 AM

## 2025-06-27 ENCOUNTER — TELEPHONE (OUTPATIENT)
Facility: CLINIC | Age: 45
End: 2025-06-27
Payer: COMMERCIAL

## 2025-06-27 NOTE — TELEPHONE ENCOUNTER
----- Message from Kia Cee sent at 6/26/2025  3:50 PM EDT -----  Normal pap  ----- Message -----  From: Lab, Background User  Sent: 6/20/2025  12:14 PM EDT  To: Kia Cee, JOSE ANTONIO-CNMADISON, JOSE ANTONIO-CNP, DNP

## 2025-06-27 NOTE — TELEPHONE ENCOUNTER
Patient returned call and was informed of pap results. Patient was also under the impression that a birth control would be sent to her pharmacy. Patient had F/U with PCP and stated her blood pressures are fine.

## 2025-07-14 ENCOUNTER — APPOINTMENT (OUTPATIENT)
Dept: CARDIOLOGY | Facility: HOSPITAL | Age: 45
End: 2025-07-14
Payer: COMMERCIAL